# Patient Record
Sex: FEMALE | Race: WHITE | NOT HISPANIC OR LATINO | Employment: OTHER | URBAN - METROPOLITAN AREA
[De-identification: names, ages, dates, MRNs, and addresses within clinical notes are randomized per-mention and may not be internally consistent; named-entity substitution may affect disease eponyms.]

---

## 2017-01-05 ENCOUNTER — CLINICAL SUPPORT (OUTPATIENT)
Dept: DIABETES | Facility: CLINIC | Age: 77
End: 2017-01-05
Payer: MEDICARE

## 2017-01-05 DIAGNOSIS — N18.2 TYPE 2 DIABETES MELLITUS WITH STAGE 2 CHRONIC KIDNEY DISEASE, WITHOUT LONG-TERM CURRENT USE OF INSULIN: Primary | ICD-10-CM

## 2017-01-05 DIAGNOSIS — N18.2 TYPE 2 DIABETES MELLITUS WITH STAGE 2 CHRONIC KIDNEY DISEASE, WITHOUT LONG-TERM CURRENT USE OF INSULIN: ICD-10-CM

## 2017-01-05 DIAGNOSIS — E11.22 TYPE 2 DIABETES MELLITUS WITH STAGE 2 CHRONIC KIDNEY DISEASE, WITHOUT LONG-TERM CURRENT USE OF INSULIN: Primary | ICD-10-CM

## 2017-01-05 DIAGNOSIS — E11.22 TYPE 2 DIABETES MELLITUS WITH STAGE 2 CHRONIC KIDNEY DISEASE, WITHOUT LONG-TERM CURRENT USE OF INSULIN: ICD-10-CM

## 2017-01-05 PROCEDURE — G0108 DIAB MANAGE TRN  PER INDIV: HCPCS | Mod: S$GLB,,, | Performed by: DIETITIAN, REGISTERED

## 2017-01-05 NOTE — PROGRESS NOTES
01/05/17 0000   Diabetes Type   Diabetes Type  Type II   Diabetes History   Diabetes Diagnosis >10 years   Nutrition   Meal Planning skipping meals;eats out seldom;snacks between meal;artificial sweeteners  (Pt reports loss of taste buds and states that she tastes little to nothing. Reports not liking many starchy foods and reports that most carbs come from sweets. Drinks very little water)   Meal Plan 24 Hour Recall - Breakfast drinks 1 pot of coffee in the AM and then 1 hr later will have PRO and veg   Meal Plan 24 Hour Recall - Lunch around 2 PM has PRO and veg   Meal Plan 24 Hour Recall - Dinner states that she eats very little dinner because of her hernia   Meal Plan 24 Hour Recall - Snack states that she eats 1/2 cup ice cream in the middle of the day   Monitoring    Blood Glucose Logs No  (no logs present at visit, reports not SMBG since first being dx)   Exercise    Frequency Never   Current Diabetes Treatment    Current Treatment Oral Medicaiton  (taking Metformin 1000 mg 2 times per day. denies missed doses of meds. Reports that in the last 2 weeks she had family over and had misplaced some of her meds - she seems to think that it was her BP and CHOL meds)   Social History   Preferred Learning Method Face to Face;Demonstration;Hands On;Reading Materials   Primary Support Self   Barriers to Change   Barriers to Change Cognitive  (Pt reports memory loss, and chart identifies memory issues as well - Pt is a poor historian re meds)   Learning Challenges  Other  (memory loss)   Readiness to Learn    Readiness to Learn  Acceptance   Diabetes Education Visit   Diabetes Education Record Assessment/Progress Initial/Comprehensive   Diabetes Education Assessment/Progress   Acute Complications (preventing, detecting, and treating acute complications) DC;IS;1  (explained that risk of hypo is very low with current meds)   Chronic Complications (preventing, detecting, and treating chronic complications)  DC;IS;1  (reviewed standards of care)   Nutrition (Incorporating nutritional management into one's lifestyle) DC;IS;W;5;1  (instructed on what foods have carbs, timing and spacing of meals and snacks, importance of meal balance, provided DM meal planning guide and encouraged 3 regular balanced meals with small portions to help with hernia. Also encouraged plenty of water rather than coffee and diet drinks)   Physical Activity (incorporating physical activity into one's lifestyle) DC;IS;5;1  (encouraged 150 min per wk)   Medications (states correct name, dose, onset, peak, duration, side effects & timing of meds) DC;IS;1  (discussed timing and MOA of metformin)   Monitoring (monitoring blood glucose/other parameters &using results) D;DC;IS;5;1  (Provided Accu-Chek Avivia Connect meter. Pt refused to return demo stating that she understood how to use the meter. Instructed to SMBG daily at varying times of the day and to bring logs to all PCP visits)   Goal Setting and Problem Solving (verbalizes behavior change strategies & sets realistic goals) DC   Behavior Change (developing personal strategies to health & behavior change) DC   Psychosocial Issues (deveopling personal srategies to address psychosocial concerns) DC   Goals   Physical Activity Set  (Pt will gradually increaase PA to 150 min per wk)   Start Date 01/05/17   Diabetes Care Plan/Intervention   Education Plan/Intervention In F/U DSMT   Diabetes Self-Management Support Plan F/U Prov;F/U DE   Education Units of Time    Time Spent 60 min

## 2017-01-05 NOTE — TELEPHONE ENCOUNTER
Patient called to discuss bringing her medications to pharmacy for med rec and pill box filling. A message was left on her phone with the [facility] direct line ([phone]).    Could you reach out to her to set up a time for med rec and pill box filling?    Thanks,  THOR

## 2017-01-11 ENCOUNTER — OFFICE VISIT (OUTPATIENT)
Dept: OPHTHALMOLOGY | Facility: CLINIC | Age: 77
End: 2017-01-11
Payer: MEDICARE

## 2017-01-11 DIAGNOSIS — H04.123 BILATERAL DRY EYES: ICD-10-CM

## 2017-01-11 DIAGNOSIS — H40.043 STEROID RESPONDERS, BILATERAL: Primary | ICD-10-CM

## 2017-01-11 DIAGNOSIS — H26.493 POSTERIOR CAPSULAR OPACIFICATION, BILATERAL: ICD-10-CM

## 2017-01-11 DIAGNOSIS — T86.8409 REJECTION OF CORNEAL GRAFT: ICD-10-CM

## 2017-01-11 PROCEDURE — 99999 PR PBB SHADOW E&M-EST. PATIENT-LVL II: CPT | Mod: PBBFAC,,, | Performed by: OPHTHALMOLOGY

## 2017-01-11 PROCEDURE — 92014 COMPRE OPH EXAM EST PT 1/>: CPT | Mod: S$GLB,,, | Performed by: OPHTHALMOLOGY

## 2017-01-11 NOTE — MR AVS SNAPSHOT
[facility] - Ophthalmology  [address]  [place] [address]  Phone: [phone]  Fax: [phone]                  Kayla Sanchez   2017 2:45 PM   Office Visit    Description:  Female : 1940   Provider:  Joe Crane MD   Department:  [facility] - [facility]           Reason for Visit     Blurred Vision left eye           Diagnoses this Visit        Comments    Steroid responders, bilateral    -  Primary     Rejection of corneal graft         Bilateral dry eyes         Posterior capsular opacification, bilateral                To Do List           Future Appointments        Provider Department Dept Phone    2017 12:00 PM Annette Ruiz MD [facility] - Internal Medicine [phone]      Goals (5 Years of Data)     None      [facility]     [facility] Nurse Care Line -  Assistance  Registered nurses in the [facility] provide clinical advisement, health education, appointment booking, and other advisory services.  Call for this free service at [phone].             Medications           Message regarding Medications     Verify the changes and/or additions to your medication regime listed below are the same as discussed with your clinician today.  If any of these changes or additions are incorrect, please notify your healthcare provider.             Verify that the below list of medications is an accurate representation of the medications you are currently taking.  If none reported, the list may be blank. If incorrect, please contact your healthcare provider. Carry this list with you in case of emergency.           Current Medications     ACCU-CHEK FABIO Strp 1 strip by Misc.(Non-Drug; Combo Route) route once daily.    ACCU-CHEK FASTCLIX Misc 1 each by Misc.(Non-Drug; Combo Route) route once daily.    ascorbic acid (VITAMIN C) 500 MG tablet Take 500 mg by mouth once daily.    aspirin (ECOTRIN) 81 MG EC tablet Take 81 mg by mouth once daily.     atenolol-chlorthalidone (TENORETIC) 50-25 mg Tab Take 1 tablet by mouth every evening.    calcium carbonate 400 mg (1,000 mg) Chew Take by mouth. 1 Tablet, Chewable Oral .  Take 3 tablets PO X 1 week then2 tablets PO for 1 week then1 tablet PO for 1 week    cyclosporine 2% opht drop Place 1 drop into both eyes 2 (two) times daily.    dorzolamide-timolol 2-0.5% (COSOPT) 22.3-6.8 mg/mL ophthalmic solution INSTILL 1 DROP INTO BOTH EYES TWICE DAILY    folic acid (FOLVITE) 1 MG tablet Take 1 mg by mouth once daily.    GLUCOSAMINE/D3/BOSWELLIA FAISAL (OSTEO BI-FLEX, 5-LOXIN, ORAL) Take 1 tablet by mouth once daily.    levothyroxine (SYNTHROID) 100 MCG tablet TAKE ONE TABLET BY MOUTH ONE TIME DAILY BEFORE BREAKFAST    lisinopril (PRINIVIL,ZESTRIL) 2.5 MG tablet TAKE 1 TABLET BY MOUTH DAILY    magnesium oxide (MAG-OX) 250 mg Tab Take 250 mg by mouth. 1 Tablet Oral Every day    metformin (GLUCOPHAGE) 1000 MG tablet Take 1 tablet (1,000 mg total) by mouth 2 (two) times daily with meals.    mometasone (NASONEX) 50 mcg/actuation nasal spray 2 sprays by Nasal route once daily. 1 Spray, Non-Aerosol Nasal Every evening    multivitamin (THERAGRAN) per tablet Take by mouth. 1 Tablet Oral Every evening    naproxen (NAPROSYN) 250 MG tablet Take 250 mg by mouth as needed.    NON FORMULARY MEDICATION Cyclosporin eye gtts 2% one gtt OU BID    omeprazole 20 mg TbEC Take 20 mg by mouth daily 2 hours after breakfast.    polyethylene glycol (GLYCOLAX) 17 gram PwPk Take 17 g by mouth daily as needed.    potassium chloride SA (K-DUR,KLOR-CON) 20 MEQ tablet Take 1 tablet (20 mEq total) by mouth once daily. 1 Tablet, ER Particles/Crystals Oral Every day    selenium sulfide (SELSUN) 2.5 % Susp 2.5 %.  Shampoo Topical Every evening    simvastatin (ZOCOR) 40 MG tablet TAKE 1 TABLET BY MOUTH AT BEDTIME    tobramycin sulfate 0.3% (TOBREX) 0.3 % ophthalmic solution 1-2 drops topically twice daily to affected area right great toe.    vitamin D 1000  units Tab Take 185 mg by mouth 2 (two) times daily.           Clinical Reference Information           Allergies as of 1/11/2017     No Known Allergies      Immunizations Administered on Date of Encounter - 1/11/2017     None

## 2017-01-11 NOTE — PROGRESS NOTES
HPI     DLS 5/04/16     Pt states she cannot tell any change in her VA since last visit. The OS is   still blurred.  Did not get new glasses-waiting for the OD to stabilize   and unsure when that will be.  C/o OU still burn, if instills drops and   they do not stop burning after 4 hrs, she just takes a nap.      Eye meds: Cyclosporin BID                      Cosopt BID OU         Last edited by Deanna Batres on 1/11/2017  2:57 PM.         Assessment /Plan     For exam results, see Encounter Report.    Steroid responders, bilateral    Rejection of corneal graft    Bilateral dry eyes - Both Eyes    Posterior capsular opacification, bilateral      OU DSEK graft attached and clear. Signs and symptoms of graft rejection reviewed.  Visually significant posterior capsular opacity present.  Discussed risks, benefits, and alternatives to laser surgery.  Plan YAG OS.   C/o stinging of CsA, but hx of IOP and rejection OS.

## 2017-01-17 ENCOUNTER — TELEPHONE (OUTPATIENT)
Dept: OPHTHALMOLOGY | Facility: CLINIC | Age: 77
End: 2017-01-17

## 2017-01-17 NOTE — TELEPHONE ENCOUNTER
----- Message from Tha Valdes sent at 1/17/2017 12:30 PM CST -----  Contact: Kayla Sanchez [[id number]]  Pt states she had an exam yesterday and was told by doctor to schedule a laser appt with Dr Rangel,please call back at [phone],thanks

## 2017-02-20 DIAGNOSIS — I10 ESSENTIAL HYPERTENSION: ICD-10-CM

## 2017-02-20 DIAGNOSIS — E03.9 HYPOTHYROIDISM, UNSPECIFIED TYPE: ICD-10-CM

## 2017-02-20 DIAGNOSIS — E11.9 TYPE 2 DIABETES MELLITUS WITHOUT COMPLICATION: ICD-10-CM

## 2017-02-20 DIAGNOSIS — E11.9 TYPE 2 DIABETES MELLITUS WITHOUT COMPLICATION, WITHOUT LONG-TERM CURRENT USE OF INSULIN: ICD-10-CM

## 2017-02-20 DIAGNOSIS — E78.2 MIXED HYPERLIPIDEMIA: Primary | ICD-10-CM

## 2017-02-20 NOTE — TELEPHONE ENCOUNTER
Pt would like the following medications re filled and sent in to [facility] pharmacy .      Thanks Dr. Ruiz

## 2017-02-20 NOTE — TELEPHONE ENCOUNTER
----- Message from Clair Morgan sent at 2/20/2017  3:41 PM CST -----  Contact: pharmacy/kaitlin/[phone]  Pharmacy called in regards to getting a Rx for metformin (GLUCOPHAGE) 1000 MG tablet/levothyroxine (SYNTHROID) 100 MCG tablet/ simvastatin (ZOCOR) 40 MG tablet/atenolol-chlorthalidone (TENORETIC) 50-25 mg Tab      antonio  Please advise

## 2017-02-21 RX ORDER — SIMVASTATIN 40 MG/1
40 TABLET, FILM COATED ORAL NIGHTLY
Qty: 90 TABLET | Refills: 3 | Status: SHIPPED | OUTPATIENT
Start: 2017-02-21 | End: 2018-03-09 | Stop reason: SDUPTHER

## 2017-02-21 RX ORDER — ATENOLOL AND CHLORTHALIDONE TABLET 50; 25 MG/1; MG/1
1 TABLET ORAL NIGHTLY
Qty: 90 TABLET | Refills: 3 | Status: SHIPPED | OUTPATIENT
Start: 2017-02-21 | End: 2018-03-09 | Stop reason: SDUPTHER

## 2017-02-21 RX ORDER — SIMVASTATIN 40 MG/1
40 TABLET, FILM COATED ORAL NIGHTLY
Qty: 90 TABLET | Refills: 3 | Status: SHIPPED | OUTPATIENT
Start: 2017-02-21 | End: 2017-02-21 | Stop reason: SDUPTHER

## 2017-02-21 RX ORDER — ATENOLOL AND CHLORTHALIDONE TABLET 50; 25 MG/1; MG/1
1 TABLET ORAL NIGHTLY
Qty: 90 TABLET | Refills: 3 | Status: SHIPPED | OUTPATIENT
Start: 2017-02-21 | End: 2017-02-21 | Stop reason: SDUPTHER

## 2017-02-21 RX ORDER — LEVOTHYROXINE SODIUM 100 UG/1
TABLET ORAL
Qty: 90 TABLET | Refills: 3 | Status: SHIPPED | OUTPATIENT
Start: 2017-02-21 | End: 2017-02-21 | Stop reason: SDUPTHER

## 2017-02-21 RX ORDER — LEVOTHYROXINE SODIUM 100 UG/1
TABLET ORAL
Qty: 90 TABLET | Refills: 3 | Status: SHIPPED | OUTPATIENT
Start: 2017-02-21 | End: 2018-03-09 | Stop reason: SDUPTHER

## 2017-02-21 RX ORDER — METFORMIN HYDROCHLORIDE 1000 MG/1
1000 TABLET ORAL 2 TIMES DAILY WITH MEALS
Qty: 180 TABLET | Refills: 3 | Status: SHIPPED | OUTPATIENT
Start: 2017-02-21 | End: 2017-02-21 | Stop reason: SDUPTHER

## 2017-02-21 RX ORDER — METFORMIN HYDROCHLORIDE 1000 MG/1
1000 TABLET ORAL 2 TIMES DAILY WITH MEALS
Qty: 180 TABLET | Refills: 3 | Status: SHIPPED | OUTPATIENT
Start: 2017-02-21 | End: 2018-03-09 | Stop reason: SDUPTHER

## 2017-02-21 NOTE — TELEPHONE ENCOUNTER
Patient had [facility] listed as her pharmacy, and I sent the scripts there, then realized the error and re-sent to [facility]. Can you confirm that she only wants scripts from [facility]? If please call the [facility] below and cancel those scripts sent today.     Here is the location:  [facility] [address] [facility] [address] AT [facility] [address]    Thanks!  THOR

## 2017-03-02 NOTE — TELEPHONE ENCOUNTER
----- Message from Nuha Palumbo sent at 3/1/2017  4:21 PM CST -----  Contact: Pt  Pt would a like a refill of one of her prescriptions, she can't remember which one, she says she lost it during mardi gras. She can be reached at [phone]

## 2017-03-14 ENCOUNTER — OFFICE VISIT (OUTPATIENT)
Dept: OPHTHALMOLOGY | Facility: CLINIC | Age: 77
End: 2017-03-14
Payer: MEDICARE

## 2017-03-14 DIAGNOSIS — H40.60X0 STEROID-INDUCED GLAUCOMA: ICD-10-CM

## 2017-03-14 DIAGNOSIS — H04.123 BILATERAL DRY EYES: ICD-10-CM

## 2017-03-14 DIAGNOSIS — H26.492 POSTERIOR CAPSULAR OPACIFICATION, LEFT: Primary | ICD-10-CM

## 2017-03-14 DIAGNOSIS — Z96.1 STATUS POST CATARACT EXTRACTION AND INSERTION OF INTRAOCULAR LENS, UNSPECIFIED LATERALITY: ICD-10-CM

## 2017-03-14 DIAGNOSIS — T38.0X5A STEROID-INDUCED GLAUCOMA: ICD-10-CM

## 2017-03-14 DIAGNOSIS — Z98.49 STATUS POST CATARACT EXTRACTION AND INSERTION OF INTRAOCULAR LENS, UNSPECIFIED LATERALITY: ICD-10-CM

## 2017-03-14 DIAGNOSIS — H40.30X0: ICD-10-CM

## 2017-03-14 PROCEDURE — 99999 PR PBB SHADOW E&M-EST. PATIENT-LVL I: CPT | Mod: PBBFAC,,, | Performed by: OPHTHALMOLOGY

## 2017-03-14 PROCEDURE — 92014 COMPRE OPH EXAM EST PT 1/>: CPT | Mod: 57,S$GLB,, | Performed by: OPHTHALMOLOGY

## 2017-03-14 PROCEDURE — 66821 AFTER CATARACT LASER SURGERY: CPT | Mod: LT,S$GLB,, | Performed by: OPHTHALMOLOGY

## 2017-03-14 NOTE — MR AVS SNAPSHOT
[facility] - Ophthalmology  [address]  [facility] [address]  Phone: [phone]  Fax: [phone]                  Kayla Sanchez   3/14/2017 1:30 PM   Office Visit    Description:  Female : 1940   Provider:  Leandro Rangel MD   Department:  [facility] - Ophthalmology           Reason for Visit     Glaucoma     Blurred Vision           Diagnoses this Visit        Comments    Posterior capsular opacification, left    -  Primary     Steroid-induced glaucoma         Traumatic glaucoma         Status post cataract extraction and insertion of intraocular lens, unspecified laterality         Bilateral dry eyes                To Do List           Future Appointments        Provider Department Dept Phone    3/27/2017 9:00 AM Leandro Rangel MD [facility] Ophthalmology [phone]      Goals (5 Years of Data)     None      Follow-Up and Disposition     Return in about 2 weeks (around 3/28/2017), or if symptoms worsen or fail to improve, for Pressure and Dilate.      [facility]     [facility] On Call Nurse Care Line -  Assistance  Registered nurses in the [facility] Call Center provide clinical advisement, health education, appointment booking, and other advisory services.  Call for this free service at [phone].             Medications           Message regarding Medications     Verify the changes and/or additions to your medication regime listed below are the same as discussed with your clinician today.  If any of these changes or additions are incorrect, please notify your healthcare provider.             Verify that the below list of medications is an accurate representation of the medications you are currently taking.  If none reported, the list may be blank. If incorrect, please contact your healthcare provider. Carry this list with you in case of emergency.           Current Medications     ACCU-CHEK FABIO Strp 1 strip by Misc.(Non-Drug; Combo Route) route once daily.     ACCU-CHEK FASTCLIX Misc 1 each by Misc.(Non-Drug; Combo Route) route once daily.    ascorbic acid (VITAMIN C) 500 MG tablet Take 500 mg by mouth once daily.    aspirin (ECOTRIN) 81 MG EC tablet Take 81 mg by mouth once daily.    atenolol-chlorthalidone (TENORETIC) 50-25 mg Tab Take 1 tablet by mouth every evening.    calcium carbonate 400 mg (1,000 mg) Chew Take by mouth. 1 Tablet, Chewable Oral .  Take 3 tablets PO X 1 week then2 tablets PO for 1 week then1 tablet PO for 1 week    cyclosporine 2% opht drop Place 1 drop into both eyes 2 (two) times daily.    dorzolamide-timolol 2-0.5% (COSOPT) 22.3-6.8 mg/mL ophthalmic solution INSTILL 1 DROP INTO BOTH EYES TWICE DAILY    folic acid (FOLVITE) 1 MG tablet Take 1 mg by mouth once daily.    GLUCOSAMINE/D3/BOSWELLIA FAISAL (OSTEO BI-FLEX, 5-LOXIN, ORAL) Take 1 tablet by mouth once daily.    levothyroxine (SYNTHROID) 100 MCG tablet TAKE ONE TABLET BY MOUTH ONE TIME DAILY BEFORE BREAKFAST    lisinopril (PRINIVIL,ZESTRIL) 2.5 MG tablet TAKE 1 TABLET BY MOUTH DAILY    magnesium oxide (MAG-OX) 250 mg Tab Take 250 mg by mouth. 1 Tablet Oral Every day    metformin (GLUCOPHAGE) 1000 MG tablet Take 1 tablet (1,000 mg total) by mouth 2 (two) times daily with meals.    mometasone (NASONEX) 50 mcg/actuation nasal spray 2 sprays by Nasal route once daily. 1 Spray, Non-Aerosol Nasal Every evening    multivitamin (THERAGRAN) per tablet Take by mouth. 1 Tablet Oral Every evening    naproxen (NAPROSYN) 250 MG tablet Take 250 mg by mouth as needed.    NON FORMULARY MEDICATION Cyclosporin eye gtts 2% one gtt OU BID    omeprazole 20 mg TbEC Take 20 mg by mouth daily 2 hours after breakfast.    polyethylene glycol (GLYCOLAX) 17 gram PwPk Take 17 g by mouth daily as needed.    potassium chloride SA (K-DUR,KLOR-CON) 20 MEQ tablet Take 1 tablet (20 mEq total) by mouth once daily. 1 Tablet, ER Particles/Crystals Oral Every day    selenium sulfide (SELSUN) 2.5 % Susp 2.5 %.  Shampoo Topical Every  evening    simvastatin (ZOCOR) 40 MG tablet Take 1 tablet (40 mg total) by mouth every evening.    tobramycin sulfate 0.3% (TOBREX) 0.3 % ophthalmic solution 1-2 drops topically twice daily to affected area right great toe.    vitamin D 1000 units Tab Take 185 mg by mouth 2 (two) times daily.           Clinical Reference Information           Allergies as of 3/14/2017     No Known Allergies      Immunizations Administered on Date of Encounter - 3/14/2017     None      Orders Placed During Today's Visit      Normal Orders This Visit    Yag Capsulotomy - OS - Left Eye       Language Assistance Services     ATTENTION: Language assistance services are available, free of charge. Please call [phone].      ATENCIÓN: Si elenila aide, tiene a beltre disposición servicios gratuitos de asistencia lingüística. Llame al [phone].     DEMETRI Ý: N?u b?n nói Ti?ng Vi?t, có các d?ch v? h? tr? ngôn ng? mi?n phí dành cho b?n. G?i s? [phone].         [facility] complies with applicable Federal civil rights laws and does not discriminate on the basis of race, color, national origin, age, disability, or sex.

## 2017-03-14 NOTE — PROGRESS NOTES
HPI     Glaucoma    Additional comments: Poss Yag Cap OS- Per Rosa Maria           Comments   Blurred vision OS  Reports taking Cosopt BID OU and CS BID OS  Told to have laser for membrane removed left eye       Last edited by Leandro Rangel MD on 3/14/2017  6:22 PM. (History)            Assessment /Plan     For exam results, see Encounter Report.    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 Signs and symptoms of graft rejection reviewed.   -Hx rejection OS in the past off steroids      Dry Eye Syndrome: discussed use of warm compresses, preserved & non-preserved artificial tears, gel and PM ointment options.            Laser Capsulotomy  left eye    Laser Capsulotomy Surgery Consent:  Patient with visually significant capsular opacification negatively impacting visually based ADLs and QOL.  Discussed with patient options, risks and benefits, expectations of laser surgery with questions and answers to facilitate discussion.  We specifically covered the risks of IOP spike, bleeding, lens disruption, persistent visual disturbance,macular edema, retinal detachment,  iritis & pain,  and the need for further surgery.  The patient  voiced good understanding and patient wishes to proceed with surgery.  The patient will likely  benefit from laser surgery and patient signed consent for Left Eye.      The correct eye was identified by patient prior to start of the procedure.    YAG Capsulotomy:    Total Energy:  97.6 mJ    Total # of Exposures: 67 Burst    Patient tolerated procedure well       Kayla understands the information Dr. Rangel provided at the time of visit.  The patient voices good understanding of these these instructions and agrees with the plan.  Retinal detachment precautions were discussed and patient is to return for increasing flashes, floaters and decreasing vision.  In addition, patient will return to clinic sooner as needed for pain, decreasing vision etc.    PF 1% 4x/day for 4-5 Days in the eye with laser treatment      RD precautions:  Discussed with patient symptoms of RD with increased flashes, floaters, decreasing vision.  Patient/Family to call and return immediately to clinic should the symptoms of RD occur.  patient voice good understanding.        Plan   RTC  1-2 weeks with IOP & DFE  / same day with Joe Crane   --> check adherence & DSEK rejection OS  RTC sooner prn with good understanding

## 2017-03-15 ENCOUNTER — TELEPHONE (OUTPATIENT)
Dept: OPHTHALMOLOGY | Facility: CLINIC | Age: 77
End: 2017-03-15

## 2017-03-15 NOTE — TELEPHONE ENCOUNTER
----- Message from Anju Westfall sent at 3/14/2017  5:04 PM CDT -----  Ulysses,    I called [facility].  They actually have two refills on file.     -Anju   ----- Message -----     From: Candy Beard     Sent: 3/14/2017   4:42 PM       To: Joe Crane MD, [facility] Staff    Help!  Dr. Rangel would marino for Dr. Crane to refill pt's cyclosporin gtts.   Pt is almost out of them.   Thank you!!!!    Spoke to pt and confirmed that she needs to call [facility] for her refill

## 2017-03-15 NOTE — TELEPHONE ENCOUNTER
----- Message from Anju Westfall sent at 3/14/2017  5:04 PM CDT -----  Ulysses,    I called [facility].  They actually have two refills on file.     -Anju   ----- Message -----     From: Candy Beard     Sent: 3/14/2017   4:42 PM       To: Joe Crane MD, [facility] Staff    Help!  Dr. Rangel would marino for Dr. Crane to refill pt's cyclosporin gtts.   Pt is almost out of them.   Thank you!!!!

## 2017-03-27 ENCOUNTER — OFFICE VISIT (OUTPATIENT)
Dept: OPHTHALMOLOGY | Facility: CLINIC | Age: 77
End: 2017-03-27
Payer: MEDICARE

## 2017-03-27 DIAGNOSIS — Z96.1 STATUS POST CATARACT EXTRACTION AND INSERTION OF INTRAOCULAR LENS, UNSPECIFIED LATERALITY: Primary | ICD-10-CM

## 2017-03-27 DIAGNOSIS — H40.30X0: ICD-10-CM

## 2017-03-27 DIAGNOSIS — Z98.49 STATUS POST CATARACT EXTRACTION AND INSERTION OF INTRAOCULAR LENS, UNSPECIFIED LATERALITY: Primary | ICD-10-CM

## 2017-03-27 DIAGNOSIS — T38.0X5A STEROID-INDUCED GLAUCOMA: ICD-10-CM

## 2017-03-27 DIAGNOSIS — T85.398S: ICD-10-CM

## 2017-03-27 DIAGNOSIS — H40.60X0 STEROID-INDUCED GLAUCOMA: ICD-10-CM

## 2017-03-27 DIAGNOSIS — H04.123 BILATERAL DRY EYES: ICD-10-CM

## 2017-03-27 PROBLEM — H26.492 LEFT POSTERIOR CAPSULAR OPACIFICATION: Status: RESOLVED | Noted: 2017-03-14 | Resolved: 2017-03-27

## 2017-03-27 PROCEDURE — 99999 PR PBB SHADOW E&M-EST. PATIENT-LVL I: CPT | Mod: PBBFAC,,, | Performed by: OPHTHALMOLOGY

## 2017-03-27 PROCEDURE — 99024 POSTOP FOLLOW-UP VISIT: CPT | Mod: S$GLB,,, | Performed by: OPHTHALMOLOGY

## 2017-03-27 NOTE — PROGRESS NOTES
HPI     Post-op Evaluation    Additional comments: Pt here for post op Yag Cap evaluation of OS           Comments   DLS: 03/14/2017  Dr. Rangel    C/o: Pt states she thinks the vision in the left eye is doing about the   same she doesn't notice anything different. 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     Meds: Cosopt BID OU             Cyclosporin gtt's.  BID OU ( Pt states she mostly puts it in   her OS)        Last edited by Miranda Patterson MA on 3/27/2017  9:21 AM. (History)            Assessment /Plan     For exam results, see Encounter Report.    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 Signs and symptoms of graft rejection reviewed.   -Hx rejection OS in the past off steroids      Dry Eye Syndrome: discussed use of warm compresses, preserved & non-preserved artificial tears, gel and PM ointment options.        Plan   RTC  2 months Joe Crane   --> check adherence & DSEK rejection OS  RTC sooner prn with good understanding

## 2017-03-27 NOTE — Clinical Note
MICHAEL Crane Patient unable to take steroids with elevated IOP Now taking CS 2% but unable to afford ? Letter to insurance to cover cost as no other alternative

## 2017-05-01 DIAGNOSIS — E11.9 TYPE 2 DIABETES MELLITUS WITHOUT COMPLICATION: ICD-10-CM

## 2017-05-17 ENCOUNTER — TELEPHONE (OUTPATIENT)
Dept: OPHTHALMOLOGY | Facility: CLINIC | Age: 77
End: 2017-05-17

## 2017-05-31 ENCOUNTER — OFFICE VISIT (OUTPATIENT)
Dept: OPHTHALMOLOGY | Facility: CLINIC | Age: 77
End: 2017-05-31
Payer: MEDICARE

## 2017-05-31 DIAGNOSIS — H40.60X0 STEROID-INDUCED GLAUCOMA: Primary | ICD-10-CM

## 2017-05-31 DIAGNOSIS — H18.519 FUCHS' CORNEAL DYSTROPHY: ICD-10-CM

## 2017-05-31 DIAGNOSIS — T38.0X5A STEROID-INDUCED GLAUCOMA: Primary | ICD-10-CM

## 2017-05-31 PROCEDURE — 92014 COMPRE OPH EXAM EST PT 1/>: CPT | Mod: 24,S$GLB,, | Performed by: OPHTHALMOLOGY

## 2017-05-31 PROCEDURE — 99999 PR PBB SHADOW E&M-EST. PATIENT-LVL II: CPT | Mod: PBBFAC,,, | Performed by: OPHTHALMOLOGY

## 2017-05-31 NOTE — PROGRESS NOTES
HPI     DSEK    Additional comments: Both eyes           Comments   DLS: 03/27/2017 Dr. Rangel  DLS: 01/11/2017 Dr. Crane    Patient states she is here to check on her cornea.     Meds: Cosopt BID OU              Cyclosporin gtt's.  BID OU ( Pt states she mostly puts it in   her OS)       Last edited by Zuri Saxena on 5/31/2017 10:49 AM. (History)            Assessment /Plan     For exam results, see Encounter Report.    Steroid-induced glaucoma - Left Eye    Fuchs' corneal dystrophy      IOP good, OU DSEK grafts attached and clear. Signs and symptoms of graft rejection reviewed.  Looks good.  Continue current treatment/medications

## 2017-07-11 ENCOUNTER — TELEPHONE (OUTPATIENT)
Dept: OPHTHALMOLOGY | Facility: CLINIC | Age: 77
End: 2017-07-11

## 2017-07-11 NOTE — TELEPHONE ENCOUNTER
Faxed ok for [facility], [phone] refills w/90 day supply given

## 2017-08-07 ENCOUNTER — TELEPHONE (OUTPATIENT)
Dept: INTERNAL MEDICINE | Facility: CLINIC | Age: 77
End: 2017-08-07

## 2017-08-07 NOTE — TELEPHONE ENCOUNTER
Spoke with Patient and she informed me that her daughter cancelled her appt for today due to the time not being good and that she wanted a later appt. , Patient has been advised about later date which is further out and she has been given the times to go over with her daughter and will call back to inform us if those times work for her and her daughters  .

## 2017-08-07 NOTE — TELEPHONE ENCOUNTER
----- Message from Jovana Arboleda sent at 8/7/2017  3:15 PM CDT -----  Contact: pt daughter, Ceci Brizuela  Pt daughter called in about not able to make appt in morning. She would need the latest appt  has available.      Ceci can be reached at  [phone]      Thank You

## 2017-08-07 NOTE — TELEPHONE ENCOUNTER
lvm for pt to call the clinic back pt needs to follow up with her pcp  There is an appt in the system for 08- at 8am

## 2017-08-08 ENCOUNTER — TELEPHONE (OUTPATIENT)
Dept: INTERNAL MEDICINE | Facility: CLINIC | Age: 77
End: 2017-08-08

## 2017-08-08 NOTE — TELEPHONE ENCOUNTER
Spoke with patients daughter and she informed me that they prefer later appts.    Due to patients daughters wanting to be present at appointments and that they can not afford to miss work or take off from work .     Patient daughter booked an appointment with Dr. Jacob and informed me that Dr. Ruiz was sent an e-mail asking if patient could be seen on 08/10 at 5 pm .      Please advise

## 2017-08-08 NOTE — TELEPHONE ENCOUNTER
----- Message from April Flores sent at 8/8/2017  9:16 AM CDT -----  Contact: DAUGHTER/MAR/ [phone]  Please call daughter Ceci about her mothers appointment with another provider. Dr. Ruiz  did not have anything sooner and patient need a late appointment so that both her daughters will be at her appointment.

## 2017-08-09 ENCOUNTER — TELEPHONE (OUTPATIENT)
Dept: INTERNAL MEDICINE | Facility: CLINIC | Age: 77
End: 2017-08-09

## 2017-08-09 NOTE — TELEPHONE ENCOUNTER
Spoke with patient's daughter-she would like the 8/19 appointment for 10am.  I don't have access to schedule-will route to PCP's staff so they can assist.  Call daughter to confirm appt at [phone]

## 2017-08-09 NOTE — TELEPHONE ENCOUNTER
Please let patient's daughter know that I do not work in the evenings. Would she be willing to come in on a Saturday instead, or does she also work on Saturdays?    If she is seeing Dr Jacob about having her mother assessed for dementia and memory loss, then she should consider seeing me instead. Dr Jacob is urgent care, and addresses only acute issues that can be fixed or evaluated at one appointment. Ms Sanchez's conditions are chronic and need consistent involvement from one primary care physician, which would be what we do at her appointments with me.    I could potentially be available on Sat 8/19 at the earliest.    Thanks,  THOR

## 2017-08-10 NOTE — TELEPHONE ENCOUNTER
We will get patient an appt once my schedule opens up. We are talking to administration on opening my appointment calendar for that day. We will reach out once it's all set.    Thanks,  THOR

## 2017-08-15 NOTE — TELEPHONE ENCOUNTER
Patient has been informed that we will reach out to her once approved . Patient stated that she is going out of town so re schedule after 2 weeks.

## 2017-08-16 ENCOUNTER — TELEPHONE (OUTPATIENT)
Dept: INTERNAL MEDICINE | Facility: CLINIC | Age: 77
End: 2017-08-16

## 2017-08-17 NOTE — TELEPHONE ENCOUNTER
----- Message from Amairani Lopez sent at 8/17/2017  1:22 PM CDT -----  Contact: Ofelia/Ceci [phone] work or [phone] cell  Calling to schedule an appt for Saturday.    Please call and schedule.    Thank You

## 2017-08-31 ENCOUNTER — TELEPHONE (OUTPATIENT)
Dept: INTERNAL MEDICINE | Facility: CLINIC | Age: 77
End: 2017-08-31

## 2017-09-12 ENCOUNTER — TELEPHONE (OUTPATIENT)
Dept: INTERNAL MEDICINE | Facility: CLINIC | Age: 77
End: 2017-09-12

## 2017-09-12 NOTE — TELEPHONE ENCOUNTER
----- Message from Treasure Sullivan MA sent at 8/16/2017  2:49 PM CDT -----  Please Advise when schedule is opened

## 2017-09-16 ENCOUNTER — IMMUNIZATION (OUTPATIENT)
Dept: INTERNAL MEDICINE | Facility: CLINIC | Age: 77
End: 2017-09-16
Payer: MEDICARE

## 2017-09-16 ENCOUNTER — LAB VISIT (OUTPATIENT)
Dept: LAB | Facility: HOSPITAL | Age: 77
End: 2017-09-16
Attending: INTERNAL MEDICINE
Payer: MEDICARE

## 2017-09-16 ENCOUNTER — OFFICE VISIT (OUTPATIENT)
Dept: INTERNAL MEDICINE | Facility: CLINIC | Age: 77
End: 2017-09-16
Payer: MEDICARE

## 2017-09-16 VITALS
BODY MASS INDEX: 27.34 KG/M2 | SYSTOLIC BLOOD PRESSURE: 127 MMHG | WEIGHT: 154.31 LBS | DIASTOLIC BLOOD PRESSURE: 71 MMHG | TEMPERATURE: 98 F | HEIGHT: 63 IN | HEART RATE: 59 BPM

## 2017-09-16 DIAGNOSIS — I15.2 HYPERTENSION ASSOCIATED WITH DIABETES: ICD-10-CM

## 2017-09-16 DIAGNOSIS — E78.5 HYPERLIPIDEMIA DUE TO TYPE 2 DIABETES MELLITUS: ICD-10-CM

## 2017-09-16 DIAGNOSIS — I70.0 AORTIC ATHEROSCLEROSIS: ICD-10-CM

## 2017-09-16 DIAGNOSIS — E11.59 HYPERTENSION ASSOCIATED WITH DIABETES: ICD-10-CM

## 2017-09-16 DIAGNOSIS — R41.3 MEMORY DISORDER: ICD-10-CM

## 2017-09-16 DIAGNOSIS — Z66 DO NOT RESUSCITATE: ICD-10-CM

## 2017-09-16 DIAGNOSIS — E11.69 HYPERLIPIDEMIA DUE TO TYPE 2 DIABETES MELLITUS: ICD-10-CM

## 2017-09-16 DIAGNOSIS — E03.9 HYPOTHYROIDISM, UNSPECIFIED TYPE: ICD-10-CM

## 2017-09-16 DIAGNOSIS — E83.59 OTHER DISORDERS OF CALCIUM METABOLISM: ICD-10-CM

## 2017-09-16 DIAGNOSIS — E11.40 CONTROLLED TYPE 2 DIABETES MELLITUS WITH DIABETIC NEUROPATHY, WITHOUT LONG-TERM CURRENT USE OF INSULIN: ICD-10-CM

## 2017-09-16 DIAGNOSIS — R91.1 PULMONARY NODULE: ICD-10-CM

## 2017-09-16 DIAGNOSIS — D69.2 SENILE PURPURA: ICD-10-CM

## 2017-09-16 LAB
25(OH)D3+25(OH)D2 SERPL-MCNC: 119 NG/ML
ALBUMIN SERPL BCP-MCNC: 3.6 G/DL
ALP SERPL-CCNC: 78 U/L
ALT SERPL W/O P-5'-P-CCNC: 22 U/L
ANION GAP SERPL CALC-SCNC: 13 MMOL/L
AST SERPL-CCNC: 26 U/L
BASOPHILS # BLD AUTO: 0.04 K/UL
BASOPHILS NFR BLD: 0.4 %
BILIRUB SERPL-MCNC: 0.3 MG/DL
BUN SERPL-MCNC: 25 MG/DL
CALCIUM SERPL-MCNC: 10.8 MG/DL
CHLORIDE SERPL-SCNC: 104 MMOL/L
CHOLEST SERPL-MCNC: 180 MG/DL
CHOLEST/HDLC SERPL: 3.1 {RATIO}
CO2 SERPL-SCNC: 24 MMOL/L
CREAT SERPL-MCNC: 1.1 MG/DL
DIFFERENTIAL METHOD: NORMAL
EOSINOPHIL # BLD AUTO: 0.3 K/UL
EOSINOPHIL NFR BLD: 3.1 %
ERYTHROCYTE [DISTWIDTH] IN BLOOD BY AUTOMATED COUNT: 13.7 %
EST. GFR  (AFRICAN AMERICAN): 56.4 ML/MIN/1.73 M^2
EST. GFR  (NON AFRICAN AMERICAN): 48.9 ML/MIN/1.73 M^2
ESTIMATED AVG GLUCOSE: 134 MG/DL
GLUCOSE SERPL-MCNC: 100 MG/DL
HBA1C MFR BLD HPLC: 6.3 %
HCT VFR BLD AUTO: 42.6 %
HDLC SERPL-MCNC: 59 MG/DL
HDLC SERPL: 32.8 %
HGB BLD-MCNC: 14.4 G/DL
LDLC SERPL CALC-MCNC: 92.6 MG/DL
LYMPHOCYTES # BLD AUTO: 2.4 K/UL
LYMPHOCYTES NFR BLD: 26.1 %
MCH RBC QN AUTO: 29.8 PG
MCHC RBC AUTO-ENTMCNC: 33.8 G/DL
MCV RBC AUTO: 88 FL
MONOCYTES # BLD AUTO: 1 K/UL
MONOCYTES NFR BLD: 10.7 %
NEUTROPHILS # BLD AUTO: 5.4 K/UL
NEUTROPHILS NFR BLD: 59.3 %
NONHDLC SERPL-MCNC: 121 MG/DL
PLATELET # BLD AUTO: 194 K/UL
PMV BLD AUTO: 11.2 FL
POTASSIUM SERPL-SCNC: 4.2 MMOL/L
PROT SERPL-MCNC: 8 G/DL
RBC # BLD AUTO: 4.84 M/UL
SODIUM SERPL-SCNC: 141 MMOL/L
T4 FREE SERPL-MCNC: 1.37 NG/DL
TRIGL SERPL-MCNC: 142 MG/DL
WBC # BLD AUTO: 9.08 K/UL

## 2017-09-16 PROCEDURE — 99499 UNLISTED E&M SERVICE: CPT | Mod: S$GLB,,, | Performed by: INTERNAL MEDICINE

## 2017-09-16 PROCEDURE — 36415 COLL VENOUS BLD VENIPUNCTURE: CPT

## 2017-09-16 PROCEDURE — 3078F DIAST BP <80 MM HG: CPT | Mod: S$GLB,,, | Performed by: INTERNAL MEDICINE

## 2017-09-16 PROCEDURE — 82306 VITAMIN D 25 HYDROXY: CPT

## 2017-09-16 PROCEDURE — 84439 ASSAY OF FREE THYROXINE: CPT

## 2017-09-16 PROCEDURE — 1157F ADVNC CARE PLAN IN RCRD: CPT | Mod: S$GLB,,, | Performed by: INTERNAL MEDICINE

## 2017-09-16 PROCEDURE — 99215 OFFICE O/P EST HI 40 MIN: CPT | Mod: S$GLB,,, | Performed by: INTERNAL MEDICINE

## 2017-09-16 PROCEDURE — 3008F BODY MASS INDEX DOCD: CPT | Mod: S$GLB,,, | Performed by: INTERNAL MEDICINE

## 2017-09-16 PROCEDURE — 1126F AMNT PAIN NOTED NONE PRSNT: CPT | Mod: S$GLB,,, | Performed by: INTERNAL MEDICINE

## 2017-09-16 PROCEDURE — 80053 COMPREHEN METABOLIC PANEL: CPT

## 2017-09-16 PROCEDURE — 99999 PR PBB SHADOW E&M-EST. PATIENT-LVL III: CPT | Mod: PBBFAC,,, | Performed by: INTERNAL MEDICINE

## 2017-09-16 PROCEDURE — 1159F MED LIST DOCD IN RCRD: CPT | Mod: S$GLB,,, | Performed by: INTERNAL MEDICINE

## 2017-09-16 PROCEDURE — 83036 HEMOGLOBIN GLYCOSYLATED A1C: CPT

## 2017-09-16 PROCEDURE — 3074F SYST BP LT 130 MM HG: CPT | Mod: S$GLB,,, | Performed by: INTERNAL MEDICINE

## 2017-09-16 PROCEDURE — 80061 LIPID PANEL: CPT

## 2017-09-16 PROCEDURE — G0008 ADMIN INFLUENZA VIRUS VAC: HCPCS | Mod: S$GLB,,, | Performed by: INTERNAL MEDICINE

## 2017-09-16 PROCEDURE — 90662 IIV NO PRSV INCREASED AG IM: CPT | Mod: S$GLB,,, | Performed by: INTERNAL MEDICINE

## 2017-09-16 PROCEDURE — 85025 COMPLETE CBC W/AUTO DIFF WBC: CPT

## 2017-09-16 NOTE — PATIENT INSTRUCTIONS
TODAY:  - geriatric driving limitations: no driving at night, during rush hour, long distances, unfamiliar cities  - think about a plan for when it's not safe to live alone  - usefulness of a neuropysch evaluation for dementia  - need paperwork for PoA, DNR order, living will  - pharmacy for pill packing  - flu vaccine  - labs today

## 2017-09-16 NOTE — PROGRESS NOTES
"Primary Care Provider Appointment    Subjective:      Patient ID: Kayla Sanchez is a 77 y.o. female with HLD, T2DM, HTN, s/p corneal graft transplant complications    Chief Complaint: Memory Loss    Patient here with her daughters today, with concerns for memory loss. She endorses numerous examples (corroborated by her daughters) about short term memory issues.    Have not been checking sugars, but eats healthy. Occasionally eats a cookie, she does not exercise. Her last exercise was one year ago. She endorses medication compliance, she packs her meds into AM and PM pill trays.    Her BP is well-controlled in clinic today.     Patient states she has an acute cough for past 2 days that "went into my lungs."    She endorses short-term memory loss. She is here to finalize all paperwork for DNR and PoA discrepencies in her chart. She states, "pull the plug."    Past Surgical History:   Procedure Laterality Date    ADENOIDECTOMY      APPENDECTOMY      CATARACT EXTRACTION      CHOLECYSTECTOMY      CORNEAL TRANSPLANT      DSEK OU      EYE SURGERY Bilateral     corneal transplant    THYROID SURGERY      goiter removal    TONSILLECTOMY         Past Medical History:   Diagnosis Date    Arthritis     Diabetes mellitus     Diabetes mellitus type II     GERD (gastroesophageal reflux disease)     Glaucoma     History of migraine headaches     Hyperlipidemia     Hypertension     Hypothyroidism     Type 2 diabetes mellitus with renal manifestations 1/20/2016       Review of Systems   Constitutional: Negative for activity change, appetite change and unexpected weight change.   Respiratory: Negative for cough and shortness of breath.    Cardiovascular: Negative for chest pain and leg swelling.   Musculoskeletal: Negative for back pain and gait problem.   Hematological: Bruises/bleeds easily.   Psychiatric/Behavioral: Positive for confusion and decreased concentration. Negative for agitation and behavioral " "problems. The patient is not hyperactive.        Objective:   /71 (BP Location: Left arm, Patient Position: Sitting)   Pulse (!) 59   Temp 98.3 °F (36.8 °C) (Oral)   Ht 5' 3" (1.6 m)   Wt 70 kg (154 lb 5.2 oz)   BMI 27.34 kg/m²     Physical Exam   Constitutional: She is oriented to person, place, and time. She appears well-developed and well-nourished.   obese   HENT:   Head: Normocephalic and atraumatic.   Neck: Normal range of motion.   Cardiovascular:   bradycardic   Pulmonary/Chest: Effort normal.   Abdominal:   obese   Musculoskeletal: She exhibits no edema or deformity.   Neurological: She is alert and oriented to person, place, and time.   Skin: Skin is warm.   Ecchymoses and purpura on UE bilaterally   Psychiatric: She has a normal mood and affect.   Decreased short-term memory   Vitals reviewed.              Lab Results   Component Value Date    WBC 9.08 09/16/2017    HGB 14.4 09/16/2017    HCT 42.6 09/16/2017     09/16/2017    CHOL 180 09/16/2017    TRIG 142 09/16/2017    HDL 59 09/16/2017    ALT 22 09/16/2017    AST 26 09/16/2017     09/16/2017    K 4.2 09/16/2017     09/16/2017    CREATININE 1.1 09/16/2017    BUN 25 (H) 09/16/2017    CO2 24 09/16/2017    TSH 5.813 (H) 12/15/2016    HGBA1C 6.3 (H) 09/16/2017         Assessment:   77 y.o. female with multiple co-morbid illnesses here to continue work-up of chronic issues notably HLD, T2DM, HTN, s/p corneal graft transplant complications.     Plan:     Problem List Items Addressed This Visit        Neuro    Memory disorder     · Will perform MMSE at next appt to determine if neuropsych testing is warranted  · Decreased patient appointment load  · Message schedulers to make ALL appointments at [facility]  · Reduce confusion while driving  · Patient with PoA forms today for daughter in [place]  · Will call daughter with med update on patient  · Symbol Ta [phone]  · Patient to fax a list of meds that she is taking  · Will remove meds " from med list once compliance is verified         Relevant Orders    Comprehensive metabolic panel (Completed)    CBC auto differential (Completed)    Vitamin D (Completed)       Pulmonary    Pulmonary nodule     Seen on 1/2016 CT scan, non-calcified, 5mm, monitor q12 mos, 77 pack year history  · CT chest ordered stable in 12/2016  · Monitor clinically and per patient request  · Patient and family not interested in scheduled annual monitoring            Cardiac/Vascular    Hypertension associated with diabetes     BP controlled, DM controlled  · Continue BB, diuretic, ACE  · Check electrolytes         Relevant Orders    Comprehensive metabolic panel (Completed)    CBC auto differential (Completed)    Hemoglobin A1c (Completed)    Hyperlipidemia due to type 2 diabetes mellitus     High ASCVD risk with DM  · continue statin  · Better diabetic control         Relevant Orders    Hemoglobin A1c (Completed)    Lipid panel (Completed)    Aortic atherosclerosis     Seen on CXR 11/11/13  · Continue statin  · BP control         Relevant Orders    Lipid panel (Completed)       Hematology    Senile purpura     Ecchymoses on UE, active in her home  · Advised mindfulness            Endocrine    Hypothyroidism     Some symptoms of memory loss, decreased mood. H/o thyroidectomy 10 years ago  · Free T4 normal in 12/2016, TSH elevated  · Check TSH, free T4 today         Relevant Orders    T4, free (Completed)       Palliative Care    Do not resuscitate     Patient with insight into her chronic conditions and does not want invasive interventions  · Patient wanting DNR           Other Visit Diagnoses     Controlled type 2 diabetes mellitus with diabetic neuropathy, without long-term current use of insulin        Relevant Orders    Influenza - High Dose (65+) (PF) (IM)    Hemoglobin A1c (Completed)    Other disorders of calcium metabolism         Relevant Orders    Vitamin D (Completed)          Health Maintenance       Date Due  Completion Date    TETANUS VACCINE 06/09/1958 ---    Lipid Panel 01/22/2017 1/22/2016- TODAY    Hemoglobin A1c 06/15/2017 12/15/2016- TODAY    Influenza Vaccine 08/01/2017 9/15/2016- TODAY    Override on 1/6/2015: Done    Override on 11/11/2013: Done    Foot Exam 12/15/2017 12/15/2016    Override on 1/20/2016: Done    Eye Exam 05/31/2018 5/31/2017    DEXA SCAN 12/16/2018 12/16/2016          Return in about 4 weeks (around 10/14/2017). . One hour spent with this patient today, half of that in counseling.    Annette Ruiz MD/MPH  Internal Medicine  [facility]  [phone]

## 2017-09-16 NOTE — Clinical Note
Gino Sotelo, Was this patient given her flu vaccine? It is not recorded as adminstered, so I can't close the encounter.  Thanks, THOR Ruiz MD/MPH Internal Medicine [facility] [phone]

## 2017-09-16 NOTE — ASSESSMENT & PLAN NOTE
A1c well-controlled, compliant with metformin and lifestyle changes  · Continue metformin and lifestyle changes

## 2017-09-16 NOTE — ASSESSMENT & PLAN NOTE
Patient with insight into her chronic conditions and does not want invasive interventions  · Patient wanting DNR

## 2017-09-16 NOTE — ASSESSMENT & PLAN NOTE
Seen on 1/2016 CT scan, non-calcified, 5mm, monitor q12 mos, 77 pack year history  · CT chest ordered stable in 12/2016  · Monitor clinically and per patient request  · Patient and family not interested in scheduled annual monitoring

## 2017-09-16 NOTE — ASSESSMENT & PLAN NOTE
Some symptoms of memory loss, decreased mood. H/o thyroidectomy 10 years ago  · Free T4 normal in 12/2016, TSH elevated  · Check TSH, free T4 today

## 2017-09-16 NOTE — ASSESSMENT & PLAN NOTE
· Will perform MMSE at next appt to determine if neuropsych testing is warranted  · Decreased patient appointment load  · Message schedulers to make ALL appointments at [facility] [place]  · Reduce confusion while driving  · Patient with PoA forms today for daughter in [place]  · Will call daughter with med update on patient  · Symbol Ta [phone]  · Patient to fax a list of meds that she is taking  · Will remove meds from med list once compliance is verified

## 2017-09-18 ENCOUNTER — TELEPHONE (OUTPATIENT)
Dept: PHARMACY | Facility: CLINIC | Age: 77
End: 2017-09-18

## 2017-09-20 ENCOUNTER — TELEPHONE (OUTPATIENT)
Dept: INTERNAL MEDICINE | Facility: CLINIC | Age: 77
End: 2017-09-20

## 2017-09-20 NOTE — TELEPHONE ENCOUNTER
Please advise patient and her daughters that her labs are stable (unchanged), but her vitamin D is high. Is she taking a vitamin D supplement? She should discontiune it if so.    Thanks,  KJ

## 2017-09-21 NOTE — TELEPHONE ENCOUNTER
Patient has been advised about unchanged lab work and to discontinue the Vitamin D supplement.    Patient verbalized great understanding.

## 2017-12-16 ENCOUNTER — OFFICE VISIT (OUTPATIENT)
Dept: URGENT CARE | Facility: CLINIC | Age: 77
End: 2017-12-16
Payer: MEDICARE

## 2017-12-16 VITALS
WEIGHT: 130 LBS | DIASTOLIC BLOOD PRESSURE: 80 MMHG | BODY MASS INDEX: 20.89 KG/M2 | OXYGEN SATURATION: 99 % | HEART RATE: 66 BPM | TEMPERATURE: 99 F | RESPIRATION RATE: 18 BRPM | SYSTOLIC BLOOD PRESSURE: 146 MMHG | HEIGHT: 66 IN

## 2017-12-16 DIAGNOSIS — S83.001A SUBLUXATION OF RIGHT PATELLA, INITIAL ENCOUNTER: Primary | ICD-10-CM

## 2017-12-16 DIAGNOSIS — M25.561 ACUTE PAIN OF RIGHT KNEE: ICD-10-CM

## 2017-12-16 PROCEDURE — 99202 OFFICE O/P NEW SF 15 MIN: CPT | Mod: S$GLB,,, | Performed by: NURSE PRACTITIONER

## 2017-12-16 NOTE — PATIENT INSTRUCTIONS
PLEASE FOLLOW UP WITH ORTHO AS DISCUSSED. A REFERRAL WAS SENT FOR YOUR CONVENIENCE  CALL [phone] TO SET UP AN APPOINTMENT    Common Kneecap (Patella) Problems  If the kneecap is off track even slightly (a tracking problem), it can cause uneven pressure on the back of the kneecap. This can cause pain and difficulty with movements, such as walking and going down stairs. Below are some common causes of kneecap pain.    Cartilage damage  Sometimes the cartilage on the back of the kneecap or in the groove of the thighbone is damaged. Damaged cartilage cant spread pressure evenly. Uneven pressure wears down the cartilage even further.   Dislocation  Sometimes a muscle or ligament in the knee is pulled the wrong way. Or the kneecap may be pushed too hard. Then the kneecap may move partly out of the groove (subluxation). It may even move completely out (dislocation).     Patellar tendinitis  Patellar tendinitis (jumpers knee) happens when the quadriceps muscles are overused or tight. During movement, the patellar tendon absorbs more shock than usual. The tendon becomes irritated or damaged.   Plica syndrome  Plica bands are tissue fibers that some people have near the kneecap. They usually cause no problems. But sometimes they can become irritated or inflamed.   Date Last Reviewed: 10/15/2015  © 3128-8161 [employer]. [address] [place] [address], [place] [address]. All rights reserved. This information is not intended as a substitute for professional medical care. Always follow your healthcare professional's instructions.

## 2017-12-16 NOTE — PROGRESS NOTES
"Subjective:       Patient ID: Kayla Sanchez is a 77 y.o. female.    Vitals:  height is 5' 6" (1.676 m) and weight is 59 kg (130 lb). Her oral temperature is 98.6 °F (37 °C). Her blood pressure is 146/80 (abnormal) and her pulse is 66. Her respiration is 18 and oxygen saturation is 99%.     Chief Complaint: Knee Pain (Right Knee)    Patient states she twisted her knee in her sleep.        Knee Pain    The incident occurred 2 days ago. The incident occurred at home. The injury mechanism was a twisting injury. The pain is present in the right thigh. The pain is at a severity of 6/10. The pain is moderate. The pain has been constant since onset. Associated symptoms include an inability to bear weight. She reports no foreign bodies present. The symptoms are aggravated by movement and weight bearing. She has tried nothing for the symptoms. The treatment provided no relief.     Review of Systems   Constitution: Negative for chills and fever.   HENT: Negative for sore throat.    Eyes: Negative for blurred vision.   Cardiovascular: Negative for chest pain.   Respiratory: Negative for shortness of breath.    Skin: Negative for rash.   Musculoskeletal: Positive for joint pain and joint swelling. Negative for back pain.   Gastrointestinal: Negative for abdominal pain, diarrhea, nausea and vomiting.   Neurological: Negative for headaches.   Psychiatric/Behavioral: The patient is not nervous/anxious.        Objective:      Physical Exam   Constitutional: She is oriented to person, place, and time. She appears well-developed and well-nourished. She is cooperative.  Non-toxic appearance. She does not appear ill. No distress.   HENT:   Head: Normocephalic and atraumatic.   Right Ear: Hearing, tympanic membrane, external ear and ear canal normal.   Left Ear: Hearing, tympanic membrane, external ear and ear canal normal.   Nose: Nose normal. No mucosal edema, rhinorrhea or nasal deformity. No epistaxis. Right sinus exhibits no " maxillary sinus tenderness and no frontal sinus tenderness. Left sinus exhibits no maxillary sinus tenderness and no frontal sinus tenderness.   Mouth/Throat: Uvula is midline, oropharynx is clear and moist and mucous membranes are normal. No trismus in the jaw. Normal dentition. No uvula swelling. No posterior oropharyngeal erythema.   Eyes: Conjunctivae and lids are normal. Right eye exhibits no discharge. Left eye exhibits no discharge. No scleral icterus.   Sclera clear bilat   Neck: Trachea normal, normal range of motion, full passive range of motion without pain and phonation normal. Neck supple.   Cardiovascular: Normal rate, regular rhythm, normal heart sounds, intact distal pulses and normal pulses.    Pulmonary/Chest: Effort normal and breath sounds normal. No respiratory distress.   Abdominal: Soft. Normal appearance and bowel sounds are normal. She exhibits no distension, no pulsatile midline mass and no mass. There is no tenderness.   Musculoskeletal: She exhibits no edema or deformity.        Right knee: She exhibits decreased range of motion, swelling (GENERALIZED), effusion and ecchymosis. She exhibits no erythema. No tenderness found.        Legs:  Neurological: She is alert and oriented to person, place, and time. She exhibits normal muscle tone. Coordination normal.   Skin: Skin is warm, dry and intact. She is not diaphoretic. No pallor.   Psychiatric: She has a normal mood and affect. Her speech is normal and behavior is normal. Judgment and thought content normal. She exhibits abnormal recent memory.   Nursing note and vitals reviewed.      X-Ray Knee 3 View Right 12/16/2017 None Specified          RESULTS:  Right knee exam was performed with AP, lateral, and patellar views. No prior exam.     The skeletal structures are intact. No fracture or dislocation is identified. The patella is subluxed laterally. Degenerative joint disease is evident with small spurring around the knee and severe  narrowing of the patellofemoral joint space. Tibiofemoral joint spaces are better preserved. No erosions are detected. Small joint effusion is present in suprapatellar area. Soft tissue swelling may be present along the anterior aspect of the knee  IMPRESSION:    DJD right knee .        Electronically signed by: DEQUAN BETH MD  Date:                                            12/16/17  Time:                                           14:44        Assessment:       1. Subluxation of right patella, initial encounter    2. Acute pain of right knee        Plan:       PLAN DISCUSSED WITH Dr. CONCEPCION  Subluxation of right patella, initial encounter  -     Ambulatory referral to Orthopedics    Acute pain of right knee  -     X-Ray Knee 3 View Right; Future; Expected date: 12/16/2017      Patient Instructions     PLEASE FOLLOW UP WITH ORTHO AS DISCUSSED. A REFERRAL WAS SENT FOR YOUR CONVENIENCE  CALL [phone] TO SET UP AN APPOINTMENT    Common Kneecap (Patella) Problems  If the kneecap is off track even slightly (a tracking problem), it can cause uneven pressure on the back of the kneecap. This can cause pain and difficulty with movements, such as walking and going down stairs. Below are some common causes of kneecap pain.    Cartilage damage  Sometimes the cartilage on the back of the kneecap or in the groove of the thighbone is damaged. Damaged cartilage cant spread pressure evenly. Uneven pressure wears down the cartilage even further.   Dislocation  Sometimes a muscle or ligament in the knee is pulled the wrong way. Or the kneecap may be pushed too hard. Then the kneecap may move partly out of the groove (subluxation). It may even move completely out (dislocation).     Patellar tendinitis  Patellar tendinitis (jumpers knee) happens when the quadriceps muscles are overused or tight. During movement, the patellar tendon absorbs more shock than usual. The tendon becomes irritated or damaged.   Plica  syndrome  Plica bands are tissue fibers that some people have near the kneecap. They usually cause no problems. But sometimes they can become irritated or inflamed.   Date Last Reviewed: 10/15/2015  © 0158-0242 [employer]. [address] [place] [address], [place] [address]. All rights reserved. This information is not intended as a substitute for professional medical care. Always follow your healthcare professional's instructions.

## 2017-12-20 ENCOUNTER — OFFICE VISIT (OUTPATIENT)
Dept: ORTHOPEDICS | Facility: CLINIC | Age: 77
End: 2017-12-20
Payer: MEDICARE

## 2017-12-20 ENCOUNTER — HOSPITAL ENCOUNTER (OUTPATIENT)
Dept: RADIOLOGY | Facility: HOSPITAL | Age: 77
Discharge: HOME | End: 2017-12-20
Attending: PHYSICIAN ASSISTANT
Payer: MEDICARE

## 2017-12-20 VITALS — BODY MASS INDEX: 26.82 KG/M2 | HEIGHT: 63 IN | WEIGHT: 151.38 LBS

## 2017-12-20 DIAGNOSIS — M25.561 RIGHT KNEE PAIN, UNSPECIFIED CHRONICITY: ICD-10-CM

## 2017-12-20 DIAGNOSIS — M17.11 OSTEOARTHRITIS OF RIGHT KNEE, UNSPECIFIED OSTEOARTHRITIS TYPE: Primary | ICD-10-CM

## 2017-12-20 PROCEDURE — 73560 X-RAY EXAM OF KNEE 1 OR 2: CPT | Mod: 26,50,, | Performed by: RADIOLOGY

## 2017-12-20 PROCEDURE — 99203 OFFICE O/P NEW LOW 30 MIN: CPT | Mod: S$GLB,,, | Performed by: PHYSICIAN ASSISTANT

## 2017-12-20 PROCEDURE — 99999 PR PBB SHADOW E&M-EST. PATIENT-LVL IV: CPT | Mod: PBBFAC,,, | Performed by: PHYSICIAN ASSISTANT

## 2017-12-20 PROCEDURE — 73560 X-RAY EXAM OF KNEE 1 OR 2: CPT | Mod: 50,TC

## 2017-12-20 NOTE — PROGRESS NOTES
Subjective:      Patient ID: Kayla Sanchez is a 77 y.o. female.    Chief Complaint: No chief complaint on file.    HPI  77 year old female presents with chief complaint of right knee pain x 1 week. She woke up one morning with knee pain. She doesn't recall trauma. Pain is worse with moving it. She is wearing a knee brace which helps. Aspirin gives mild relief. She reports swelling. She denies popping and giving way. She does not use assistive devices.   Review of Systems   Constitution: Negative for chills, fever and night sweats.   Cardiovascular: Negative for chest pain.   Respiratory: Negative for cough and shortness of breath.    Hematologic/Lymphatic: Does not bruise/bleed easily.   Skin: Negative for color change.   Gastrointestinal: Negative for heartburn.   Genitourinary: Negative for dysuria.   Neurological: Negative for numbness and paresthesias.   Psychiatric/Behavioral: Negative for altered mental status.   Allergic/Immunologic: Negative for persistent infections.         Objective:            General    Vitals reviewed.  Constitutional: She is oriented to person, place, and time. She appears well-developed and well-nourished.   Cardiovascular: Normal rate.    Neurological: She is alert and oriented to person, place, and time.             Right Knee Exam:  ROM 0-125 degrees  Small effusion  No warmth or erythema  TTP medial joint line      X-ray: ordered and reviewed by myself. DJD present.       Assessment:       Encounter Diagnosis   Name Primary?    Osteoarthritis of right knee, unspecified osteoarthritis type Yes          Plan:       Discussed treatment options with patient. HEP given. Ice and elevate knee. She will take aleve BID x 2 weeks. RTC if symptoms worsen or do not improve and we will consider injection.

## 2018-02-07 ENCOUNTER — PES CALL (OUTPATIENT)
Dept: ADMINISTRATIVE | Facility: CLINIC | Age: 78
End: 2018-02-07

## 2018-03-09 DIAGNOSIS — E78.2 MIXED HYPERLIPIDEMIA: ICD-10-CM

## 2018-03-09 DIAGNOSIS — I10 ESSENTIAL HYPERTENSION: ICD-10-CM

## 2018-03-09 DIAGNOSIS — E11.9 TYPE 2 DIABETES MELLITUS WITHOUT COMPLICATION, WITHOUT LONG-TERM CURRENT USE OF INSULIN: ICD-10-CM

## 2018-03-09 DIAGNOSIS — E03.9 HYPOTHYROIDISM, UNSPECIFIED TYPE: ICD-10-CM

## 2018-03-12 RX ORDER — METFORMIN HYDROCHLORIDE 1000 MG/1
TABLET ORAL
Qty: 180 TABLET | Refills: 3 | Status: SHIPPED | OUTPATIENT
Start: 2018-03-12 | End: 2020-10-14

## 2018-03-12 RX ORDER — LEVOTHYROXINE SODIUM 100 UG/1
TABLET ORAL
Qty: 90 TABLET | Refills: 3 | Status: SHIPPED | OUTPATIENT
Start: 2018-03-12 | End: 2020-10-14

## 2018-03-12 RX ORDER — ATENOLOL AND CHLORTHALIDONE TABLET 50; 25 MG/1; MG/1
TABLET ORAL
Qty: 90 TABLET | Refills: 3 | Status: SHIPPED | OUTPATIENT
Start: 2018-03-12 | End: 2020-10-14

## 2018-03-12 RX ORDER — SIMVASTATIN 40 MG/1
40 TABLET, FILM COATED ORAL NIGHTLY
Qty: 90 TABLET | Refills: 3 | Status: SHIPPED | OUTPATIENT
Start: 2018-03-12 | End: 2020-10-14

## 2018-03-12 NOTE — TELEPHONE ENCOUNTER
Please let patient know that metformin, levothyroxine, tenoretic and zocor were all sent to [facility] for a one year supply.    Does she want a follow-up appt?    Thanks,  KJ

## 2018-05-21 ENCOUNTER — PES CALL (OUTPATIENT)
Dept: ADMINISTRATIVE | Facility: CLINIC | Age: 78
End: 2018-05-21

## 2018-06-27 ENCOUNTER — OFFICE VISIT (OUTPATIENT)
Dept: OPTOMETRY | Facility: CLINIC | Age: 78
End: 2018-06-27
Payer: COMMERCIAL

## 2018-06-27 DIAGNOSIS — H40.33X3 GLAUCOMA OF BOTH EYES ASSOCIATED WITH OCULAR TRAUMA, SEVERE STAGE: ICD-10-CM

## 2018-06-27 DIAGNOSIS — T85.398D MECHANICAL COMPLICATION DUE TO CORNEAL GRAFT, SUBSEQUENT ENCOUNTER: ICD-10-CM

## 2018-06-27 DIAGNOSIS — H52.7 REFRACTIVE ERROR: ICD-10-CM

## 2018-06-27 DIAGNOSIS — E11.22 TYPE 2 DIABETES MELLITUS WITH STAGE 2 CHRONIC KIDNEY DISEASE, WITHOUT LONG-TERM CURRENT USE OF INSULIN: Primary | ICD-10-CM

## 2018-06-27 DIAGNOSIS — N18.2 TYPE 2 DIABETES MELLITUS WITH STAGE 2 CHRONIC KIDNEY DISEASE, WITHOUT LONG-TERM CURRENT USE OF INSULIN: Primary | ICD-10-CM

## 2018-06-27 DIAGNOSIS — E11.9 TYPE 2 DIABETES MELLITUS WITHOUT RETINOPATHY: ICD-10-CM

## 2018-06-27 PROCEDURE — 92015 DETERMINE REFRACTIVE STATE: CPT | Mod: S$GLB,,, | Performed by: OPTOMETRIST

## 2018-06-27 PROCEDURE — 99999 PR PBB SHADOW E&M-EST. PATIENT-LVL II: CPT | Mod: PBBFAC,,, | Performed by: OPTOMETRIST

## 2018-06-27 PROCEDURE — 99499 UNLISTED E&M SERVICE: CPT | Mod: S$GLB,,, | Performed by: OPTOMETRIST

## 2018-06-27 PROCEDURE — 92014 COMPRE OPH EXAM EST PT 1/>: CPT | Mod: S$GLB,,, | Performed by: OPTOMETRIST

## 2018-06-27 RX ORDER — DORZOLAMIDE HYDROCHLORIDE AND TIMOLOL MALEATE 20; 5 MG/ML; MG/ML
1 SOLUTION/ DROPS OPHTHALMIC 2 TIMES DAILY
Qty: 10 ML | Refills: 6 | Status: SHIPPED | OUTPATIENT
Start: 2018-06-27 | End: 2018-11-28 | Stop reason: SDUPTHER

## 2018-06-27 NOTE — PROGRESS NOTES
ESTRELLA HI 05/2017 with Dr. Crane. OS looks blurred, sometimes clears up.    Glasses about 3 yrs. Old, Needs new ones.  Not using any drops.  Not sure which drops and how much to use  Prev probs with drops stinging.     Last edited by Osmani Patterson, OD on 6/27/2018  3:09 PM. (History)            Assessment /Plan     For exam results, see Encounter Report.    Type 2 diabetes mellitus with stage 2 chronic kidney disease, without long-term current use of insulin    Mechanical complication due to corneal graft, subsequent encounter  -     cyclosporine 2% opht drop; Place 1 drop into both eyes 2 (two) times daily.  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  Dispense: 10 mL; Refill: 6    Refractive error    Type 2 diabetes mellitus without retinopathy      1. No diabetic retinopathy, no csme. Return in 1 year for dilated eye exam.  2. DSEK clear, restart cyclosporine drops 2x/day, pt is steroid responder, follow up with Rosa Maria for next appt,  3. Restart Glaucoma drop cosopt 2x/day, use art tears before med drop to help with stinging. RTC with Claire for follow up on glaucoma care.   4. Spec Rx given. Different lens options discussed with patient.        5. No diabetic retinopathy, no csme. Return in 1 year for dilated eye exam.

## 2018-11-28 ENCOUNTER — TELEPHONE (OUTPATIENT)
Dept: INTERNAL MEDICINE | Facility: CLINIC | Age: 78
End: 2018-11-28

## 2018-11-28 ENCOUNTER — OFFICE VISIT (OUTPATIENT)
Dept: OPHTHALMOLOGY | Facility: CLINIC | Age: 78
End: 2018-11-28
Payer: MEDICARE

## 2018-11-28 DIAGNOSIS — H40.33X3 GLAUCOMA OF BOTH EYES ASSOCIATED WITH OCULAR TRAUMA, SEVERE STAGE: ICD-10-CM

## 2018-11-28 DIAGNOSIS — Z94.7 STATUS POST CORNEAL TRANSPLANT: ICD-10-CM

## 2018-11-28 DIAGNOSIS — Z96.1 STATUS POST CATARACT EXTRACTION AND INSERTION OF INTRAOCULAR LENS, UNSPECIFIED LATERALITY: ICD-10-CM

## 2018-11-28 DIAGNOSIS — Z98.49 STATUS POST CATARACT EXTRACTION AND INSERTION OF INTRAOCULAR LENS, UNSPECIFIED LATERALITY: ICD-10-CM

## 2018-11-28 DIAGNOSIS — H40.32X3 GLAUCOMA OF LEFT EYE ASSOCIATED WITH OCULAR TRAUMA, SEVERE STAGE: Primary | ICD-10-CM

## 2018-11-28 DIAGNOSIS — H04.123 BILATERAL DRY EYES: ICD-10-CM

## 2018-11-28 DIAGNOSIS — Z91.199 PATIENT NONADHERENCE: ICD-10-CM

## 2018-11-28 PROCEDURE — 92020 GONIOSCOPY: CPT | Mod: S$GLB,,, | Performed by: OPHTHALMOLOGY

## 2018-11-28 PROCEDURE — 99999 PR PBB SHADOW E&M-EST. PATIENT-LVL III: CPT | Mod: PBBFAC,,, | Performed by: OPHTHALMOLOGY

## 2018-11-28 PROCEDURE — 92014 COMPRE OPH EXAM EST PT 1/>: CPT | Mod: S$GLB,,, | Performed by: OPHTHALMOLOGY

## 2018-11-28 RX ORDER — DORZOLAMIDE HYDROCHLORIDE AND TIMOLOL MALEATE 20; 5 MG/ML; MG/ML
1 SOLUTION/ DROPS OPHTHALMIC 2 TIMES DAILY
Qty: 10 ML | Refills: 6 | Status: SHIPPED | OUTPATIENT
Start: 2018-11-28 | End: 2018-12-26 | Stop reason: SDUPTHER

## 2018-11-28 RX ORDER — DORZOLAMIDE HYDROCHLORIDE AND TIMOLOL MALEATE 20; 5 MG/ML; MG/ML
1 SOLUTION/ DROPS OPHTHALMIC 2 TIMES DAILY
Qty: 10 ML | Refills: 6 | Status: SHIPPED | OUTPATIENT
Start: 2018-11-28 | End: 2018-11-28 | Stop reason: SDUPTHER

## 2018-11-28 RX ORDER — LATANOPROST 50 UG/ML
1 SOLUTION/ DROPS OPHTHALMIC NIGHTLY
COMMUNITY
End: 2018-11-28 | Stop reason: SDUPTHER

## 2018-11-28 RX ORDER — LATANOPROST 50 UG/ML
1 SOLUTION/ DROPS OPHTHALMIC NIGHTLY
Qty: 2.5 ML | Refills: 12 | Status: SHIPPED | OUTPATIENT
Start: 2018-11-28 | End: 2019-09-29 | Stop reason: SDUPTHER

## 2018-11-28 NOTE — PROGRESS NOTES
HPI     Glaucoma      Additional comments: overdue iop chk              Comments     Status post cataract extraction and insertion of intraocular lens,   unspecified laterality  Corneal graft malfunction, sequela  Bilateral dry eyes - Both Eyes  Traumatic glaucoma - Left Eye  Steroid-induced glaucoma - Left Eye          Last edited by Agapito Downey on 11/28/2018  2:42 PM. (History)            Assessment /Plan     For exam results, see Encounter Report.    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, severe stage    Bilateral dry eyes - Both Eyes    Status post cataract extraction and insertion of intraocular lens, unspecified laterality    Status post corneal transplant    Patient nonadherence      Patient with Daughter in law  Living with son's family    Lost to FU sc meds x >months 3/27/2017 --> 11/28/2018 19 // 39    Advanced POAG / SP DSEK OU  Steroid Induced glaucoma -left eye     CCT  685 // 726    Low teens    Both eyes  --> Non-adherence x months --> discussed with Daughter in law 11/28/2018  Cosopt BID --> restart OS  Xal q day --> start and discussed SE, use and expectations with Q & A    Alphagan BID --> fatigue / Boggy Sterling OU --> hold --> patient takes as substitute for CS --> stop this    Left eye  CS BID --> difficulty with cost --> marked steroid responder --> Off x months / Hold    Laser Trabeculoplasty  left eye 3/12/2014    PC IOL OU  SP YAG CAP OS 3/14/2017      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 Signs and symptoms of graft rejection reviewed.   -Hx rejection OS in the past off steroids    Dry Eye Syndrome: discussed use of warm compresses, preserved & non-preserved artificial tears, gel and PM ointment options.        Plan   RTC  1 weeks IOP   --> check adherence  RTC sooner prn with good understanding

## 2018-11-28 NOTE — Clinical Note
Patient presents / lost to fuNon-adherent 2/2 memory / dementiaSeems to living with Daughter in law now

## 2018-12-05 ENCOUNTER — OFFICE VISIT (OUTPATIENT)
Dept: OPHTHALMOLOGY | Facility: CLINIC | Age: 78
End: 2018-12-05
Payer: MEDICARE

## 2018-12-05 DIAGNOSIS — Z96.1 STATUS POST CATARACT EXTRACTION AND INSERTION OF INTRAOCULAR LENS, UNSPECIFIED LATERALITY: ICD-10-CM

## 2018-12-05 DIAGNOSIS — Z94.7 STATUS POST CORNEAL TRANSPLANT: ICD-10-CM

## 2018-12-05 DIAGNOSIS — H04.123 BILATERAL DRY EYES: ICD-10-CM

## 2018-12-05 DIAGNOSIS — Z91.199 PATIENT NONADHERENCE: Primary | ICD-10-CM

## 2018-12-05 DIAGNOSIS — H40.60X0 STEROID-INDUCED GLAUCOMA: ICD-10-CM

## 2018-12-05 DIAGNOSIS — T38.0X5A STEROID-INDUCED GLAUCOMA: ICD-10-CM

## 2018-12-05 DIAGNOSIS — Z98.49 STATUS POST CATARACT EXTRACTION AND INSERTION OF INTRAOCULAR LENS, UNSPECIFIED LATERALITY: ICD-10-CM

## 2018-12-05 DIAGNOSIS — H40.32X3 GLAUCOMA OF LEFT EYE ASSOCIATED WITH OCULAR TRAUMA, SEVERE STAGE: ICD-10-CM

## 2018-12-05 PROCEDURE — 92012 INTRM OPH EXAM EST PATIENT: CPT | Mod: S$GLB,,, | Performed by: OPHTHALMOLOGY

## 2018-12-05 PROCEDURE — 99999 PR PBB SHADOW E&M-EST. PATIENT-LVL II: CPT | Mod: PBBFAC,,, | Performed by: OPHTHALMOLOGY

## 2018-12-07 NOTE — PROGRESS NOTES
HPI     Patient with Daughter in law  Never got Cosopt    Last edited by Leandro Rangel MD on 12/7/2018  5:20 PM. (History)            Assessment /Plan     For exam results, see Encounter Report.    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, severe stage    Status post cataract extraction and insertion of intraocular lens, unspecified laterality    Status post corneal transplant    Steroid-induced glaucoma - Left Eye    Bilateral dry eyes - Both Eyes      Patient with Daughter in law  Living with son's family    Lost to FU sc meds x >months 3/27/2017 --> 11/28/2018 20 // 39    Advanced POAG / SP DSEK OU  Steroid Induced glaucoma -left eye     CCT  685 // 726    Low teens    Both eyes  --> Non-adherence x months --> re-discussed with Daughter in law 11/28/2018  Cosopt BID --> never got --> started in clinic   Xal q day    Alphagan BID --> fatigue / Boggy Sterling OU --> hold --> patient takes as substitute for CS --> stop this    Left eye  CS BID --> difficulty with cost --> marked steroid responder --> Off x months / Hold    Laser Trabeculoplasty  left eye 3/12/2014    PC IOL OU  SP YAG CAP OS 3/14/2017      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 Signs and symptoms of graft rejection reviewed.   -Hx rejection OS in the past off steroids    Dry Eye Syndrome: discussed use of warm compresses, preserved & non-preserved artificial tears, gel and PM ointment options.        Plan   RTC  3 weeks IOP   --> check adherence  RTC sooner prn with good understanding

## 2018-12-16 NOTE — PROGRESS NOTES
Assessment /Plan     For exam results, see Encounter Report.    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, severe stage    Status post corneal transplant    Status post cataract extraction and insertion of intraocular lens, unspecified laterality    Patient nonadherence    Bilateral dry eyes - Both Eyes      Patient with Daughter in law  Living with son's family    Lost to FU sc meds x >months 3/27/2017 --> 11/28/2018 20 // 39    Advanced POAG / SP DSEK OU  Steroid Induced glaucoma -left eye     CCT  685 // 726    Low teens    Both eyes  --> Non-adherence x months --> re-discussed with Daughter in law 11/28/2018  Cosopt BID --> never got --> started in clinic   Xal q day    Alphagan BID --> fatigue / Boggy Sterling OU --> hold --> patient takes as substitute for CS --> stop this    Left eye  CS BID --> difficulty with cost --> marked steroid responder --> Off x months / Hold    Laser Trabeculoplasty  left eye 3/12/2014    PC IOL OU  SP YAG CAP OS 3/14/2017      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 Signs and symptoms of graft rejection reviewed.   -Hx rejection OS in the past off steroids    Dry Eye Syndrome: discussed use of warm compresses, preserved & non-preserved artificial tears, gel and PM ointment options.        Plan   RTC  2 months IOP   --> check adherence  RTC sooner prn with good understanding

## 2018-12-26 ENCOUNTER — OFFICE VISIT (OUTPATIENT)
Dept: OPHTHALMOLOGY | Facility: CLINIC | Age: 78
End: 2018-12-26
Payer: MEDICARE

## 2018-12-26 DIAGNOSIS — H40.33X3 GLAUCOMA OF BOTH EYES ASSOCIATED WITH OCULAR TRAUMA, SEVERE STAGE: ICD-10-CM

## 2018-12-26 DIAGNOSIS — Z96.1 STATUS POST CATARACT EXTRACTION AND INSERTION OF INTRAOCULAR LENS, UNSPECIFIED LATERALITY: ICD-10-CM

## 2018-12-26 DIAGNOSIS — Z94.7 STATUS POST CORNEAL TRANSPLANT: ICD-10-CM

## 2018-12-26 DIAGNOSIS — H04.123 BILATERAL DRY EYES: ICD-10-CM

## 2018-12-26 DIAGNOSIS — H40.32X3 GLAUCOMA OF LEFT EYE ASSOCIATED WITH OCULAR TRAUMA, SEVERE STAGE: Primary | ICD-10-CM

## 2018-12-26 DIAGNOSIS — Z91.199 PATIENT NONADHERENCE: ICD-10-CM

## 2018-12-26 DIAGNOSIS — Z98.49 STATUS POST CATARACT EXTRACTION AND INSERTION OF INTRAOCULAR LENS, UNSPECIFIED LATERALITY: ICD-10-CM

## 2018-12-26 PROCEDURE — 99999 PR PBB SHADOW E&M-EST. PATIENT-LVL II: CPT | Mod: PBBFAC,,, | Performed by: OPHTHALMOLOGY

## 2018-12-26 PROCEDURE — 92012 INTRM OPH EXAM EST PATIENT: CPT | Mod: S$GLB,,, | Performed by: OPHTHALMOLOGY

## 2018-12-26 RX ORDER — DORZOLAMIDE HYDROCHLORIDE AND TIMOLOL MALEATE 20; 5 MG/ML; MG/ML
1 SOLUTION/ DROPS OPHTHALMIC 2 TIMES DAILY
Qty: 10 ML | Refills: 4 | Status: SHIPPED | OUTPATIENT
Start: 2018-12-26 | End: 2020-10-14

## 2019-02-13 ENCOUNTER — OFFICE VISIT (OUTPATIENT)
Dept: URGENT CARE | Facility: CLINIC | Age: 79
End: 2019-02-13
Payer: MEDICARE

## 2019-02-13 VITALS
WEIGHT: 151 LBS | RESPIRATION RATE: 18 BRPM | HEART RATE: 95 BPM | HEIGHT: 63 IN | DIASTOLIC BLOOD PRESSURE: 85 MMHG | SYSTOLIC BLOOD PRESSURE: 145 MMHG | BODY MASS INDEX: 26.75 KG/M2 | OXYGEN SATURATION: 98 % | TEMPERATURE: 98 F

## 2019-02-13 DIAGNOSIS — L03.113 CELLULITIS OF RIGHT UPPER EXTREMITY: Primary | ICD-10-CM

## 2019-02-13 DIAGNOSIS — M25.531 RIGHT WRIST PAIN: ICD-10-CM

## 2019-02-13 PROCEDURE — 3079F PR MOST RECENT DIASTOLIC BLOOD PRESSURE 80-89 MM HG: ICD-10-PCS | Mod: CPTII,S$GLB,, | Performed by: PHYSICIAN ASSISTANT

## 2019-02-13 PROCEDURE — 99214 OFFICE O/P EST MOD 30 MIN: CPT | Mod: S$GLB,,, | Performed by: PHYSICIAN ASSISTANT

## 2019-02-13 PROCEDURE — 73110 XR WRIST COMPLETE 3 VIEWS RIGHT: ICD-10-PCS | Mod: RT,S$GLB,, | Performed by: RADIOLOGY

## 2019-02-13 PROCEDURE — 3077F PR MOST RECENT SYSTOLIC BLOOD PRESSURE >= 140 MM HG: ICD-10-PCS | Mod: CPTII,S$GLB,, | Performed by: PHYSICIAN ASSISTANT

## 2019-02-13 PROCEDURE — 73110 X-RAY EXAM OF WRIST: CPT | Mod: RT,S$GLB,, | Performed by: RADIOLOGY

## 2019-02-13 PROCEDURE — 1101F PT FALLS ASSESS-DOCD LE1/YR: CPT | Mod: CPTII,S$GLB,, | Performed by: PHYSICIAN ASSISTANT

## 2019-02-13 PROCEDURE — 99214 PR OFFICE/OUTPT VISIT, EST, LEVL IV, 30-39 MIN: ICD-10-PCS | Mod: S$GLB,,, | Performed by: PHYSICIAN ASSISTANT

## 2019-02-13 PROCEDURE — 3079F DIAST BP 80-89 MM HG: CPT | Mod: CPTII,S$GLB,, | Performed by: PHYSICIAN ASSISTANT

## 2019-02-13 PROCEDURE — 1101F PR PT FALLS ASSESS DOC 0-1 FALLS W/OUT INJ PAST YR: ICD-10-PCS | Mod: CPTII,S$GLB,, | Performed by: PHYSICIAN ASSISTANT

## 2019-02-13 PROCEDURE — 3077F SYST BP >= 140 MM HG: CPT | Mod: CPTII,S$GLB,, | Performed by: PHYSICIAN ASSISTANT

## 2019-02-13 RX ORDER — CEPHALEXIN 500 MG/1
500 CAPSULE ORAL 4 TIMES DAILY
Qty: 40 CAPSULE | Refills: 0 | Status: SHIPPED | OUTPATIENT
Start: 2019-02-13 | End: 2019-02-23

## 2019-02-13 RX ORDER — PREDNISONE 20 MG/1
40 TABLET ORAL DAILY
Qty: 10 TABLET | Refills: 0 | Status: SHIPPED | OUTPATIENT
Start: 2019-02-13 | End: 2019-02-18

## 2019-02-14 NOTE — PROGRESS NOTES
"Subjective:       Patient ID: Kayla Sanchez is a 78 y.o. female.    Vitals:  height is 5' 3" (1.6 m) and weight is 68.5 kg (151 lb). Her oral temperature is 97.6 °F (36.4 °C). Her blood pressure is 145/85 (abnormal) and her pulse is 95. Her respiration is 18 and oxygen saturation is 98%.     Chief Complaint: Hand Pain    This is a 78 y.o. female who presents today with a chief complaint of right wrist pain and swelling. Pt is unsure of what happened. Went to bed 2 nights ago and woke up with pain and swelling.  She is right hand dominant.  No known trauma.  There is redness and warmth to the right wrist with swelling of the hand and wrist.      Hand Pain    The incident occurred 2 days ago. The incident occurred at home. The injury mechanism is unknown. The pain is present in the right hand. The quality of the pain is described as aching, stabbing and shooting. The pain does not radiate. The pain is at a severity of 7/10. The pain is moderate. The pain has been constant since the incident. Pertinent negatives include no chest pain. The symptoms are aggravated by movement. She has tried nothing for the symptoms. The treatment provided no relief.       Constitution: Negative for chills, fatigue and fever.   HENT: Negative for congestion and sore throat.    Neck: Negative for painful lymph nodes.   Cardiovascular: Negative for chest pain and leg swelling.   Eyes: Negative for double vision and blurred vision.   Respiratory: Negative for cough and shortness of breath.    Gastrointestinal: Negative for nausea, vomiting and diarrhea.   Genitourinary: Negative for dysuria, frequency, urgency and history of kidney stones.   Musculoskeletal: Positive for pain, joint pain and joint swelling. Negative for muscle cramps and muscle ache.   Skin: Negative for color change, pale, rash, erythema and bruising.   Allergic/Immunologic: Negative for seasonal allergies.   Neurological: Negative for dizziness, history of vertigo, " light-headedness, passing out and headaches.   Hematologic/Lymphatic: Negative for swollen lymph nodes.   Psychiatric/Behavioral: Negative for nervous/anxious, sleep disturbance and depression. The patient is not nervous/anxious.        Objective:      Physical Exam   Constitutional: She is oriented to person, place, and time. She appears well-developed and well-nourished.   HENT:   Head: Normocephalic and atraumatic.   Eyes: Conjunctivae are normal.   Neck: Normal range of motion. Neck supple. No thyromegaly present.   Cardiovascular: Normal rate and regular rhythm. Exam reveals no gallop and no friction rub.   No murmur heard.  Pulmonary/Chest: Effort normal and breath sounds normal. She has no wheezes. She has no rales.   Musculoskeletal:        Right wrist: She exhibits decreased range of motion, tenderness and swelling.        Right hand: She exhibits decreased range of motion and tenderness (thenar eminance).   Lymphadenopathy:     She has no cervical adenopathy.   Neurological: She is alert and oriented to person, place, and time.   Skin: Skin is warm and dry. No rash noted. No erythema.   Psychiatric: She has a normal mood and affect. Her behavior is normal. Judgment and thought content normal.   Nursing note and vitals reviewed.      7:06 PM - X-rays of the right wrist show no acute fracture or dislocation.  There is DJD.  This is a preliminary reading by me.  Final report by Radiology to follow.     X-ray Wrist Complete Right    Result Date: 2/13/2019  EXAMINATION: XR WRIST COMPLETE 3 VIEWS RIGHT CLINICAL HISTORY: Pain in right wrist TECHNIQUE: PA, lateral, and oblique views of the right wrist were performed. COMPARISON: None FINDINGS: No evidence of acute fracture, dislocation, or osseous destructive process.  Negative ulnar variance is seen.  Mild scattered degenerative changes and subchondral cystic change are seen within the wrist.  Additional degenerative changes are seen at the interphalangeal joint  of the thumb.     No acute osseous abnormality identified. Electronically signed by: Radha Khanna MD Date:    02/13/2019 Time:    19:08    Assessment:       1. Cellulitis of right upper extremity    2. Right wrist pain        Plan:         Cellulitis of right upper extremity  -     cephALEXin (KEFLEX) 500 MG capsule; Take 1 capsule (500 mg total) by mouth 4 (four) times daily. for 10 days  Dispense: 40 capsule; Refill: 0    Right wrist pain  -     X-Ray Wrist Complete Right; Future; Expected date: 02/13/2019  -     predniSONE (DELTASONE) 20 MG tablet; Take 2 tablets (40 mg total) by mouth once daily. for 5 days  Dispense: 10 tablet; Refill: 0  -     Ambulatory referral to Orthopedics        Kayla was seen today for hand pain.    Diagnoses and all orders for this visit:    Cellulitis of right upper extremity  -     cephALEXin (KEFLEX) 500 MG capsule; Take 1 capsule (500 mg total) by mouth 4 (four) times daily. for 10 days    Right wrist pain  -     X-Ray Wrist Complete Right; Future  -     predniSONE (DELTASONE) 20 MG tablet; Take 2 tablets (40 mg total) by mouth once daily. for 5 days  -     Ambulatory referral to Orthopedics      Patient Instructions   - Rest.    - Drink plenty of fluids.    - Tylenol or Ibuprofen as directed as needed for fever/pain.    - Alternate Tylenol and Ibuprofen as needed for fever/pain.  This means take Tylenol, then 3 hours later take Ibuprofen, then 3 hours after that you can take Tylenol again, then 3 hours later you can take Ibuprofen again, and continue as needed.  This way, the Tylenol is scheduled 6 hours apart and the Ibuprofen is scheduled 6 hours apart, but you are getting medicine every 3 hours if needed.  - Follow up with your PCP or specialty clinic as directed in the next 1-2 weeks if not improved or as needed.  You can call [phone] to schedule an appointment with the appropriate provider.    - Go to the ED if your symptoms worsen.  - You must understand that you  have received an Urgent Care treatment only and that you may be released before all of your medical problems are known or treated.   - You, the patient, will arrange for follow up care as instructed.   - If your condition worsens or fails to improve we recommend that you receive another evaluation at the ER immediately or contact your PCP to discuss your concerns or return here.       Cellulitis  Cellulitis is an infection of the deep layers of skin. A break in the skin, such as a cut or scratch, can let bacteria under the skin. If the bacteria get to deep layers of the skin, it can be serious. If not treated, cellulitis can get into the bloodstream and lymph nodes. The infection can then spread throughout the body. This causes serious illness.  Cellulitis causes the affected skin to become red, swollen, warm, and sore. The reddened areas have a visible border. An open sore may leak fluid (pus). You may have a fever, chills, and pain.  Cellulitis is treated with antibiotics taken for 7 to 10 days. An open sore may be cleaned and covered with cool wet gauze. Symptoms should get better 1 to 2 days after treatment is started. Make sure to take all the antibiotics for the full number of days until they are gone. Keep taking the medicine even if your symptoms go away.  Home care  Follow these tips:  · Limit the use of the part of your body with cellulitis.   · If the infection is on your leg, keep your leg raised while sitting. This will help to reduce swelling.  · Take all of the antibiotic medicine exactly as directed until it is gone. Do not miss any doses, especially during the first 7 days. Dont stop taking the medicine when your symptoms get better.  · Keep the affected area clean and dry.  · Wash your hands with soap and warm water before and after touching your skin. Anyone else who touches your skin should also wash his or her hands. Don't share towels.  Follow-up care  Follow up with your healthcare provider,  or as advised. If your infection does not go away on the first antibiotic, your healthcare provider will prescribe a different one.  When to seek medical advice  Call your healthcare provider right away if any of these occur:  · Red areas that spread  · Swelling or pain that gets worse  · Fluid leaking from the skin (pus)  · Fever higher of 100.4º F (38.0º C) or higher after 2 days on antibiotics  Date Last Reviewed: 9/1/2016 © 2000-2017 [employer]. [address] [place] [id number]. All rights reserved. This information is not intended as a substitute for professional medical care. Always follow your healthcare professional's instructions.        Osteoarthritis  Osteoarthritis (also called degenerative joint disease) happens when the cartilage in a joint becomes damaged and worn. This may be due to age, wear and tear, overuse of the joint, or other problems. Osteoarthritis can affect any joint. But it is most common in hands, knees, spine, hips, and feet. Symptoms include joint stiffness, pain, and swelling.  Home care  · When a joint is more sore than usual, rest it for a day or two.  · Heat can help relieve stiffness. Take a hot bath or apply a heating pad for up to 30 minutes at a time. If symptoms are worse in the morning, using heat just after awakening can help relax the muscle and soothe the joints.   · Ice helps relieve pain and swelling. It is often used after activity. Use a cold pack wrapped in a thin cloth on the joint for 10 to 15 minutes at a time.   · Alternating hot and cold can also help relieve pain. Try this for 20 minutes at a time, several times per day.  · Exercise helps prevent the muscles and ligaments around the joint from becoming weak. It also helps maintain function in the joint.  Be as active as you can. Talk to your healthcare provider about what activity program is best for you.  · Excess weight puts a lot of extra strain on weight-bearing joints of the lower  back, hips, knees, feet and ankles. If you are overweight, talk to your healthcare provider about a safe and effective weight loss program.  · Use anti-inflammatory medicines as prescribed for pain. This includes acetaminophen or NSAIDs such as ibuprofen or naproxen. If needed, topical or injected medicines may be recommended. Talk to your healthcare provider if these options are not enough to manage your pain.  · Talk with your healthcare provider about devices that might help improve your function and reduce pain.  Follow-up care  Follow up with your healthcare provider as advised by our staff.  When to seek medical advice  Call your healthcare provider right away if any of these occur:  · Redness or swelling of a painful joint  · Discharge or pus from a painful joint  · Fever of 100.4ºF (38ºC) or higher, or as directed by your healthcare provider  · Worsening joint pain  · Decreased ability to move the joint or bear weight on the joint  Date Last Reviewed: 3/1/2017  © 9915-7463 [employer]. [address] [place] [address]. All rights reserved. This information is not intended as a substitute for professional medical care. Always follow your healthcare professional's instructions.        Gout    Gout is an inflammation of a joint due to a build-up of gout crystals in the joint fluid. This occurs when there is an excess of uric acid (a normal waste product) in the body. Uric acid builds up in the body when the kidneys are unable to filter enough of it from the blood. This may occur with age. It is also associated with kidney disease. Gout occurs more often in people with obesity, diabetes, high blood pressure, or high levels of fats in the blood. It may run in families. Gout tends to come and go. A flare up of gout is called an attack. Drinking alcohol or eating certain foods (such as shellfish or foods with additives such as high-fructose corn syrup) may increase uric acid levels in the  blood and cause a gout attack.  During a gout attack, the affected joint may become a hot, red, swollen and painful. If you have had one attack of gout, you are likely to have another. An attack of gout can be treated with medicine. If these attacks become frequent, a daily medicine may be prescribed to help the kidneys remove uric acid from the body.  Home care  During a gout attack:  · Rest painful joints. If gout affects the joints of your foot or leg, you may want to use crutches for the first few days to keep from bearing weight on the affected joint.  · When sitting or lying down, raise the painful joint to a level higher than your heart.  · Apply an ice pack (ice cubes in a plastic bag wrapped in a thin towel) over the injured area for 20 minutes every 1 to 2 hours the first day for pain relief. Continue this 3 to 4 times a day for swelling and pain.  · Avoid alcohol and foods listed below (see Preventing attacks) during a gout attack. Drink extra fluid to help flush the uric acid through your kidneys.  · If you were prescribed a medicine to treat gout, take it as your healthcare provider has instructed. Don't skip doses.  · Take anti-inflammatory medicine as directed.   · If pain medicines have been prescribed, take them exactly as directed.    Preventing attacks  · Minimize or avoid alcohol use. Excess alcohol intake can cause a gout attack.  · Limit these foods and beverages:  ¨ Organ meats, such as kidneys and liver  ¨ Certain seafoods (anchovies, sardines, shrimp, scallops, herring, mackerel)  ¨ Wild game, meat extracts and meat gravies  ¨ Foods and beverages sweetened with high-fructose corn syrup, such as sodas  · Eat a healthy diet including low-fat and nonfat dairy, whole grains, and vegetables.  · If you are overweight, talk to your healthcare provider about a weight reduction plan. Avoid fasting or extreme low calorie diets (less than 900 calories per day). This will increase uric acid levels in  the body.  · If you have diabetes or high blood pressure, work with your doctor to manage these conditions.  · Protect the joint from injury. Trauma can trigger a gout attack.  Follow-up care  Follow up with your healthcare provider, or as advised.   When to seek medical advice  Call your healthcare provider if you have any of the following:  · Fever over 100.4°F (38.ºC) with worsening joint pain  · Increasing redness around the joint  · Pain developing in another joint  · Repeated vomiting, abdominal pain, or blood in the vomit or stool (black or red color)  Date Last Reviewed: 3/1/2017  © 5691-9205 [employer]. [address] [place] [address], [place] [address]. All rights reserved. This information is not intended as a substitute for professional medical care. Always follow your healthcare professional's instructions.

## 2019-02-14 NOTE — PATIENT INSTRUCTIONS
- Rest.    - Drink plenty of fluids.    - Tylenol or Ibuprofen as directed as needed for fever/pain.    - Alternate Tylenol and Ibuprofen as needed for fever/pain.  This means take Tylenol, then 3 hours later take Ibuprofen, then 3 hours after that you can take Tylenol again, then 3 hours later you can take Ibuprofen again, and continue as needed.  This way, the Tylenol is scheduled 6 hours apart and the Ibuprofen is scheduled 6 hours apart, but you are getting medicine every 3 hours if needed.  - Follow up with your PCP or specialty clinic as directed in the next 1-2 weeks if not improved or as needed.  You can call [phone] to schedule an appointment with the appropriate provider.    - Go to the ED if your symptoms worsen.  - You must understand that you have received an Urgent Care treatment only and that you may be released before all of your medical problems are known or treated.   - You, the patient, will arrange for follow up care as instructed.   - If your condition worsens or fails to improve we recommend that you receive another evaluation at the ER immediately or contact your PCP to discuss your concerns or return here.       Cellulitis  Cellulitis is an infection of the deep layers of skin. A break in the skin, such as a cut or scratch, can let bacteria under the skin. If the bacteria get to deep layers of the skin, it can be serious. If not treated, cellulitis can get into the bloodstream and lymph nodes. The infection can then spread throughout the body. This causes serious illness.  Cellulitis causes the affected skin to become red, swollen, warm, and sore. The reddened areas have a visible border. An open sore may leak fluid (pus). You may have a fever, chills, and pain.  Cellulitis is treated with antibiotics taken for 7 to 10 days. An open sore may be cleaned and covered with cool wet gauze. Symptoms should get better 1 to 2 days after treatment is started. Make sure to take all the  antibiotics for the full number of days until they are gone. Keep taking the medicine even if your symptoms go away.  Home care  Follow these tips:  · Limit the use of the part of your body with cellulitis.   · If the infection is on your leg, keep your leg raised while sitting. This will help to reduce swelling.  · Take all of the antibiotic medicine exactly as directed until it is gone. Do not miss any doses, especially during the first 7 days. Dont stop taking the medicine when your symptoms get better.  · Keep the affected area clean and dry.  · Wash your hands with soap and warm water before and after touching your skin. Anyone else who touches your skin should also wash his or her hands. Don't share towels.  Follow-up care  Follow up with your healthcare provider, or as advised. If your infection does not go away on the first antibiotic, your healthcare provider will prescribe a different one.  When to seek medical advice  Call your healthcare provider right away if any of these occur:  · Red areas that spread  · Swelling or pain that gets worse  · Fluid leaking from the skin (pus)  · Fever higher of 100.4º F (38.0º C) or higher after 2 days on antibiotics  Date Last Reviewed: 9/1/2016  © 5005-5252 [employer]. [address] [place] [address], [place] [address]. All rights reserved. This information is not intended as a substitute for professional medical care. Always follow your healthcare professional's instructions.        Osteoarthritis  Osteoarthritis (also called degenerative joint disease) happens when the cartilage in a joint becomes damaged and worn. This may be due to age, wear and tear, overuse of the joint, or other problems. Osteoarthritis can affect any joint. But it is most common in hands, knees, spine, hips, and feet. Symptoms include joint stiffness, pain, and swelling.  Home care  · When a joint is more sore than usual, rest it for a day or two.  · Heat can help relieve stiffness. Take  a hot bath or apply a heating pad for up to 30 minutes at a time. If symptoms are worse in the morning, using heat just after awakening can help relax the muscle and soothe the joints.   · Ice helps relieve pain and swelling. It is often used after activity. Use a cold pack wrapped in a thin cloth on the joint for 10 to 15 minutes at a time.   · Alternating hot and cold can also help relieve pain. Try this for 20 minutes at a time, several times per day.  · Exercise helps prevent the muscles and ligaments around the joint from becoming weak. It also helps maintain function in the joint.  Be as active as you can. Talk to your healthcare provider about what activity program is best for you.  · Excess weight puts a lot of extra strain on weight-bearing joints of the lower back, hips, knees, feet and ankles. If you are overweight, talk to your healthcare provider about a safe and effective weight loss program.  · Use anti-inflammatory medicines as prescribed for pain. This includes acetaminophen or NSAIDs such as ibuprofen or naproxen. If needed, topical or injected medicines may be recommended. Talk to your healthcare provider if these options are not enough to manage your pain.  · Talk with your healthcare provider about devices that might help improve your function and reduce pain.  Follow-up care  Follow up with your healthcare provider as advised by our staff.  When to seek medical advice  Call your healthcare provider right away if any of these occur:  · Redness or swelling of a painful joint  · Discharge or pus from a painful joint  · Fever of 100.4ºF (38ºC) or higher, or as directed by your healthcare provider  · Worsening joint pain  · Decreased ability to move the joint or bear weight on the joint  Date Last Reviewed: 3/1/2017  © 4202-9194 [employer]. [address] [place] [address], [place] [address]. All rights reserved. This information is not intended as a substitute for professional medical care.  Always follow your healthcare professional's instructions.        Gout    Gout is an inflammation of a joint due to a build-up of gout crystals in the joint fluid. This occurs when there is an excess of uric acid (a normal waste product) in the body. Uric acid builds up in the body when the kidneys are unable to filter enough of it from the blood. This may occur with age. It is also associated with kidney disease. Gout occurs more often in people with obesity, diabetes, high blood pressure, or high levels of fats in the blood. It may run in families. Gout tends to come and go. A flare up of gout is called an attack. Drinking alcohol or eating certain foods (such as shellfish or foods with additives such as high-fructose corn syrup) may increase uric acid levels in the blood and cause a gout attack.  During a gout attack, the affected joint may become a hot, red, swollen and painful. If you have had one attack of gout, you are likely to have another. An attack of gout can be treated with medicine. If these attacks become frequent, a daily medicine may be prescribed to help the kidneys remove uric acid from the body.  Home care  During a gout attack:  · Rest painful joints. If gout affects the joints of your foot or leg, you may want to use crutches for the first few days to keep from bearing weight on the affected joint.  · When sitting or lying down, raise the painful joint to a level higher than your heart.  · Apply an ice pack (ice cubes in a plastic bag wrapped in a thin towel) over the injured area for 20 minutes every 1 to 2 hours the first day for pain relief. Continue this 3 to 4 times a day for swelling and pain.  · Avoid alcohol and foods listed below (see Preventing attacks) during a gout attack. Drink extra fluid to help flush the uric acid through your kidneys.  · If you were prescribed a medicine to treat gout, take it as your healthcare provider has instructed. Don't skip  doses.  · Take anti-inflammatory medicine as directed.   · If pain medicines have been prescribed, take them exactly as directed.    Preventing attacks  · Minimize or avoid alcohol use. Excess alcohol intake can cause a gout attack.  · Limit these foods and beverages:  ¨ Organ meats, such as kidneys and liver  ¨ Certain seafoods (anchovies, sardines, shrimp, scallops, herring, mackerel)  ¨ Wild game, meat extracts and meat gravies  ¨ Foods and beverages sweetened with high-fructose corn syrup, such as sodas  · Eat a healthy diet including low-fat and nonfat dairy, whole grains, and vegetables.  · If you are overweight, talk to your healthcare provider about a weight reduction plan. Avoid fasting or extreme low calorie diets (less than 900 calories per day). This will increase uric acid levels in the body.  · If you have diabetes or high blood pressure, work with your doctor to manage these conditions.  · Protect the joint from injury. Trauma can trigger a gout attack.  Follow-up care  Follow up with your healthcare provider, or as advised.   When to seek medical advice  Call your healthcare provider if you have any of the following:  · Fever over 100.4°F (38.ºC) with worsening joint pain  · Increasing redness around the joint  · Pain developing in another joint  · Repeated vomiting, abdominal pain, or blood in the vomit or stool (black or red color)  Date Last Reviewed: 3/1/2017  © 7056-5278 [employer]. [address] [place] [address], [place] [address]. All rights reserved. This information is not intended as a substitute for professional medical care. Always follow your healthcare professional's instructions.

## 2019-05-25 ENCOUNTER — OFFICE VISIT (OUTPATIENT)
Dept: URGENT CARE | Facility: CLINIC | Age: 79
End: 2019-05-25
Payer: MEDICARE

## 2019-05-25 VITALS
TEMPERATURE: 98 F | WEIGHT: 151 LBS | BODY MASS INDEX: 26.75 KG/M2 | DIASTOLIC BLOOD PRESSURE: 71 MMHG | SYSTOLIC BLOOD PRESSURE: 126 MMHG | RESPIRATION RATE: 18 BRPM | HEART RATE: 83 BPM | OXYGEN SATURATION: 96 % | HEIGHT: 63 IN

## 2019-05-25 DIAGNOSIS — S46.911A STRAIN OF RIGHT SHOULDER, INITIAL ENCOUNTER: Primary | ICD-10-CM

## 2019-05-25 PROCEDURE — 99214 PR OFFICE/OUTPT VISIT, EST, LEVL IV, 30-39 MIN: ICD-10-PCS | Mod: S$GLB,,, | Performed by: PHYSICIAN ASSISTANT

## 2019-05-25 PROCEDURE — 3074F SYST BP LT 130 MM HG: CPT | Mod: CPTII,S$GLB,, | Performed by: PHYSICIAN ASSISTANT

## 2019-05-25 PROCEDURE — 3074F PR MOST RECENT SYSTOLIC BLOOD PRESSURE < 130 MM HG: ICD-10-PCS | Mod: CPTII,S$GLB,, | Performed by: PHYSICIAN ASSISTANT

## 2019-05-25 PROCEDURE — 73030 X-RAY EXAM OF SHOULDER: CPT | Mod: RT,S$GLB,, | Performed by: RADIOLOGY

## 2019-05-25 PROCEDURE — 1101F PT FALLS ASSESS-DOCD LE1/YR: CPT | Mod: CPTII,S$GLB,, | Performed by: PHYSICIAN ASSISTANT

## 2019-05-25 PROCEDURE — 1101F PR PT FALLS ASSESS DOC 0-1 FALLS W/OUT INJ PAST YR: ICD-10-PCS | Mod: CPTII,S$GLB,, | Performed by: PHYSICIAN ASSISTANT

## 2019-05-25 PROCEDURE — 73030 XR SHOULDER TRAUMA 3 VIEW RIGHT: ICD-10-PCS | Mod: RT,S$GLB,, | Performed by: RADIOLOGY

## 2019-05-25 PROCEDURE — 3078F DIAST BP <80 MM HG: CPT | Mod: CPTII,S$GLB,, | Performed by: PHYSICIAN ASSISTANT

## 2019-05-25 PROCEDURE — 99214 OFFICE O/P EST MOD 30 MIN: CPT | Mod: S$GLB,,, | Performed by: PHYSICIAN ASSISTANT

## 2019-05-25 PROCEDURE — 3078F PR MOST RECENT DIASTOLIC BLOOD PRESSURE < 80 MM HG: ICD-10-PCS | Mod: CPTII,S$GLB,, | Performed by: PHYSICIAN ASSISTANT

## 2019-05-25 NOTE — PROGRESS NOTES
"Subjective:       Patient ID: Kayla Sanchez is a 78 y.o. female.    Vitals:  height is 5' 3" (1.6 m) and weight is 68.5 kg (151 lb). Her oral temperature is 98.4 °F (36.9 °C). Her blood pressure is 126/71 and her pulse is 83. Her respiration is 18 and oxygen saturation is 96%.     Chief Complaint: Shoulder Pain    This is a 78 y.o. female who presents today with a chief complaint of   Pt fell while walking her dog and hit her shoulder. Pt stopped taking all her medication. I noticed that pt was very shaky and not walking well. Pt is very forgetful    Shoulder Pain    The pain is present in the right shoulder. This is a new problem. The current episode started in the past 7 days. There has been a history of trauma. The problem occurs constantly. The problem has been gradually worsening. The quality of the pain is described as aching. The pain is at a severity of 5/10. The pain is mild. Associated symptoms include a limited range of motion. The symptoms are aggravated by activity. She has tried NSAIDS for the symptoms. The treatment provided mild relief.       Constitution: Negative for fatigue.   HENT: Negative for facial swelling and facial trauma.    Neck: Negative for neck stiffness.   Cardiovascular: Negative for chest trauma.   Eyes: Negative for eye trauma, double vision and blurred vision.   Gastrointestinal: Negative for abdominal trauma, abdominal pain and rectal bleeding.   Genitourinary: Negative for hematuria, missed menses, genital trauma and pelvic pain.   Musculoskeletal: Positive for pain, trauma, joint pain and abnormal ROM of joint.   Skin: Negative for color change, wound, abrasion, laceration, erythema and bruising.   Neurological: Negative for dizziness, history of vertigo, light-headedness, coordination disturbances, altered mental status and loss of consciousness.   Hematologic/Lymphatic: Negative for history of bleeding disorder.   Psychiatric/Behavioral: Negative for altered mental status. "       Objective:      Physical Exam   Constitutional: She is oriented to person, place, and time. Vital signs are normal. She appears well-developed and well-nourished. She does not appear ill. No distress.   HENT:   Head: Normocephalic and atraumatic.   Right Ear: External ear normal.   Left Ear: External ear normal.   Nose: Nose normal.   Eyes: Conjunctivae, EOM and lids are normal. Right eye exhibits no discharge. Left eye exhibits no discharge.   Neck: Normal range of motion. Neck supple.   Cardiovascular: Normal rate, regular rhythm and normal heart sounds. Exam reveals no gallop and no friction rub.   No murmur heard.  Pulmonary/Chest: Effort normal and breath sounds normal. No stridor. No respiratory distress. She has no decreased breath sounds. She has no wheezes. She has no rhonchi. She has no rales.   Musculoskeletal:        Right shoulder: She exhibits decreased range of motion (decreased abduction due to pain), tenderness (anterior), bony tenderness (anterior) and pain. She exhibits no swelling, no effusion, no crepitus, no deformity, no laceration, no spasm, normal pulse and normal strength.   Neurological: She is alert and oriented to person, place, and time. She has normal strength. She is not disoriented. No sensory deficit. GCS eye subscore is 4. GCS verbal subscore is 5. GCS motor subscore is 6.   Skin: Skin is warm and dry. No bruising and no rash noted. She is not diaphoretic. No erythema. No pallor.   Psychiatric: She has a normal mood and affect. Her speech is normal and behavior is normal. Cognition and memory are impaired (cannot recall what happened to her shoulder; history changes).   Nursing note and vitals reviewed.      Assessment:       1. Strain of right shoulder, initial encounter        Xr Shoulder Trauma 3 View Right    Result Date: 5/25/2019  EXAMINATION: XR SHOULDER TRAUMA 3 VIEW RIGHT CLINICAL HISTORY: Unspecified injury of right shoulder and upper arm, initial encounter  TECHNIQUE: Four views of the right shoulder were performed. COMPARISON: Chest radiograph 07/09/2009 and CT thorax 12/16/2016 FINDINGS: Overall alignment is within normal limits.  No displaced fracture, dislocation or destructive osseous process.  Mild degenerative changes noted about the shoulder.  No subcutaneous emphysema or radiodense retained foreign body about the shoulder noting 2 suspected surgical clips at the medial lower left cervical region.  Right lung apex is clear.     No acute displaced fracture-dislocation identified. Electronically signed by: Iraj Alberts MD Date:    05/25/2019 Time:    17:04    Plan:       Decreased strength  Unable to lift arm up without the help of other arm  Unable to tell me if this is an acute change or chronic - referring to ortho for evaluation.    Strain of right shoulder, initial encounter  -     XR SHOULDER TRAUMA 3 VIEW RIGHT; Future; Expected date: 05/25/2019  -     Ambulatory referral to Orthopedics      Patient Instructions   -Wear sling for comfort.  -Call [phone] to schedule an appointment with ortho. A referral has been placed in your chart.  -Take tylenol/motrin as needed for pain.    Please follow up with your primary care provider within 2-5 days if your signs and symptoms have not resolved or worsen.     If your condition worsens or fails to improve we recommend that you receive another evaluation at the emergency room immediately or contact your primary medical clinic to discuss your concerns.   You must understand that you have received an Urgent Care treatment only and that you may be released before all of your medical problems are known or treated. You, the patient, will arrange for follow up care as instructed.         Shoulder Pain with Uncertain Cause  Shoulder pain can have many causes. Pain often comes from the structures that surround the shoulder joint. These are the joint capsule, ligaments, tendons, muscles, and bursa. Pain can also come  from cartilage in the joint. Cartilage can become worn out or injured. Its important to know whats causing your pain so the healthcare provider can use the correct treatment. But sometimes its difficult to find the exact cause of shoulder pain. You may need to see a specialist (orthopedist). You may also need special tests such as a CT scan or MRI. The provider may need to use special tools to look inside the joint (arthroscopy).  Shoulder pain can be treated with a sling or a device that keeps your shoulder from moving. You can take an anti-inflammatory medicine such as ibuprofen to ease pain. You may need to do special shoulder exercises. Follow up with a specialist if the pain is severe or doesnt go away after a few weeks.  Home care  Follow these tips when caring for yourself at home:  · If a sling was given to you, leave it in place for the time advised by your healthcare provider. If you arent sure how long to wear it, ask for advice. If the sling becomes loose, adjust it so that your forearm is level with the ground. Your shoulder should feel well supported.  · Put an ice pack on the injured area for 20 minutes every 1 to 2 hours the first day. You can make your own ice pack by putting ice cubes in a plastic bag. Wrap the bag in a thin towel. Continue with ice packs 3 to 4 times a day for the next 2 days. Then use the pack as needed to ease pain and swelling.  · You may use acetaminophen or ibuprofen to control pain, unless another pain medicine was prescribed. If you have chronic liver or kidney disease, talk with your healthcare provider before using these medicines. Also talk with your provider if youve ever had a stomach ulcer or GI bleeding.  · Shoulder pain may seem worse at night, when there is less to distract you from the pain. If you sleep on your side, try to keep weight off your painful shoulder. Propping pillows behind you may stop you from rolling over onto that shoulder during  sleep.   · Shoulder and elbow joints can become stiff if left in a sling for too long. You should start range of motion exercises about 7 to 10 days after the injury. Talk with your provider to find out what type of exercises to do and how soon to start.  · You can take the sling off to shower or bathe.  Follow-up care  Follow up with your healthcare provider if you dont start to get better in the next 5 days.  When to seek medical advice  Call your healthcare provider right away if any of these occur:  · Pain or swelling gets worse or continues for more than a few days  · Your hand or fingers become cold, blue, numb, or tingly  · Large amount of bruising on your shoulder or upper arm  · Difficulty moving your hand or fingers  · Weakness in your hand or fingers  · Your shoulder becomes stiff  · It feels like your shoulder is popping out  · You are less able to do your daily activities  Date Last Reviewed: 10/1/2016  © 4486-5568 [employer]. [address] [place] [address], [place] [address]. All rights reserved. This information is not intended as a substitute for professional medical care. Always follow your healthcare professional's instructions.

## 2019-05-25 NOTE — PATIENT INSTRUCTIONS
-Wear sling for comfort.  -Call [phone] to schedule an appointment with ortho. A referral has been placed in your chart.  -Take tylenol/motrin as needed for pain.    Please follow up with your primary care provider within 2-5 days if your signs and symptoms have not resolved or worsen.     If your condition worsens or fails to improve we recommend that you receive another evaluation at the emergency room immediately or contact your primary medical clinic to discuss your concerns.   You must understand that you have received an Urgent Care treatment only and that you may be released before all of your medical problems are known or treated. You, the patient, will arrange for follow up care as instructed.         Shoulder Pain with Uncertain Cause  Shoulder pain can have many causes. Pain often comes from the structures that surround the shoulder joint. These are the joint capsule, ligaments, tendons, muscles, and bursa. Pain can also come from cartilage in the joint. Cartilage can become worn out or injured. Its important to know whats causing your pain so the healthcare provider can use the correct treatment. But sometimes its difficult to find the exact cause of shoulder pain. You may need to see a specialist (orthopedist). You may also need special tests such as a CT scan or MRI. The provider may need to use special tools to look inside the joint (arthroscopy).  Shoulder pain can be treated with a sling or a device that keeps your shoulder from moving. You can take an anti-inflammatory medicine such as ibuprofen to ease pain. You may need to do special shoulder exercises. Follow up with a specialist if the pain is severe or doesnt go away after a few weeks.  Home care  Follow these tips when caring for yourself at home:  · If a sling was given to you, leave it in place for the time advised by your healthcare provider. If you arent sure how long to wear it, ask for advice. If the sling becomes loose,  adjust it so that your forearm is level with the ground. Your shoulder should feel well supported.  · Put an ice pack on the injured area for 20 minutes every 1 to 2 hours the first day. You can make your own ice pack by putting ice cubes in a plastic bag. Wrap the bag in a thin towel. Continue with ice packs 3 to 4 times a day for the next 2 days. Then use the pack as needed to ease pain and swelling.  · You may use acetaminophen or ibuprofen to control pain, unless another pain medicine was prescribed. If you have chronic liver or kidney disease, talk with your healthcare provider before using these medicines. Also talk with your provider if youve ever had a stomach ulcer or GI bleeding.  · Shoulder pain may seem worse at night, when there is less to distract you from the pain. If you sleep on your side, try to keep weight off your painful shoulder. Propping pillows behind you may stop you from rolling over onto that shoulder during sleep.   · Shoulder and elbow joints can become stiff if left in a sling for too long. You should start range of motion exercises about 7 to 10 days after the injury. Talk with your provider to find out what type of exercises to do and how soon to start.  · You can take the sling off to shower or bathe.  Follow-up care  Follow up with your healthcare provider if you dont start to get better in the next 5 days.  When to seek medical advice  Call your healthcare provider right away if any of these occur:  · Pain or swelling gets worse or continues for more than a few days  · Your hand or fingers become cold, blue, numb, or tingly  · Large amount of bruising on your shoulder or upper arm  · Difficulty moving your hand or fingers  · Weakness in your hand or fingers  · Your shoulder becomes stiff  · It feels like your shoulder is popping out  · You are less able to do your daily activities  Date Last Reviewed: 10/1/2016  © 0399-7585 [employer]. [address] [facility],  [place] [address]. All rights reserved. This information is not intended as a substitute for professional medical care. Always follow your healthcare professional's instructions.

## 2019-08-06 ENCOUNTER — TELEPHONE (OUTPATIENT)
Dept: PRIMARY CARE CLINIC | Facility: CLINIC | Age: 79
End: 2019-08-06

## 2019-08-06 NOTE — TELEPHONE ENCOUNTER
Spoke to  (pt's daughter) who said, mom is doing well, doesn't particularly like seeing doctors but she took my name and office number and stated she will call if she needs anything.

## 2019-08-07 ENCOUNTER — TELEPHONE (OUTPATIENT)
Dept: INTERNAL MEDICINE | Facility: CLINIC | Age: 79
End: 2019-08-07

## 2019-09-25 ENCOUNTER — TELEPHONE (OUTPATIENT)
Dept: PRIMARY CARE CLINIC | Facility: CLINIC | Age: 79
End: 2019-09-25

## 2019-09-29 DIAGNOSIS — H40.33X3 GLAUCOMA OF BOTH EYES ASSOCIATED WITH OCULAR TRAUMA, SEVERE STAGE: ICD-10-CM

## 2019-09-30 RX ORDER — LATANOPROST 50 UG/ML
SOLUTION/ DROPS OPHTHALMIC
Qty: 2.5 ML | Refills: 0 | Status: SHIPPED | OUTPATIENT
Start: 2019-09-30 | End: 2019-10-23 | Stop reason: SDUPTHER

## 2019-10-23 DIAGNOSIS — H40.33X3 GLAUCOMA OF BOTH EYES ASSOCIATED WITH OCULAR TRAUMA, SEVERE STAGE: ICD-10-CM

## 2019-10-23 RX ORDER — LATANOPROST 50 UG/ML
SOLUTION/ DROPS OPHTHALMIC
Qty: 2.5 ML | Refills: 0 | Status: SHIPPED | OUTPATIENT
Start: 2019-10-23 | End: 2020-10-14

## 2020-08-07 ENCOUNTER — TELEPHONE (OUTPATIENT)
Dept: PRIMARY CARE CLINIC | Facility: CLINIC | Age: 80
End: 2020-08-07

## 2020-08-07 NOTE — TELEPHONE ENCOUNTER
----- Message from Molly Pires sent at 8/7/2020 12:49 PM CDT -----  Contact: america(step-daughter) [phone]  Pt is requesting appt. Please advise

## 2020-09-02 ENCOUNTER — OFFICE VISIT (OUTPATIENT)
Dept: PRIMARY CARE CLINIC | Facility: CLINIC | Age: 80
End: 2020-09-02
Payer: MEDICARE

## 2020-09-02 VITALS
HEART RATE: 71 BPM | BODY MASS INDEX: 28.25 KG/M2 | DIASTOLIC BLOOD PRESSURE: 68 MMHG | WEIGHT: 169.56 LBS | SYSTOLIC BLOOD PRESSURE: 132 MMHG | HEIGHT: 65 IN | OXYGEN SATURATION: 96 %

## 2020-09-02 DIAGNOSIS — Z94.7 STATUS POST CORNEAL TRANSPLANT: Primary | ICD-10-CM

## 2020-09-02 DIAGNOSIS — G30.1 LATE ONSET ALZHEIMER'S DISEASE WITH BEHAVIORAL DISTURBANCE: ICD-10-CM

## 2020-09-02 DIAGNOSIS — D69.2 SENILE PURPURA: ICD-10-CM

## 2020-09-02 DIAGNOSIS — E11.22 TYPE 2 DIABETES MELLITUS WITH STAGE 2 CHRONIC KIDNEY DISEASE, WITHOUT LONG-TERM CURRENT USE OF INSULIN: ICD-10-CM

## 2020-09-02 DIAGNOSIS — E03.9 HYPOTHYROIDISM, UNSPECIFIED TYPE: ICD-10-CM

## 2020-09-02 DIAGNOSIS — F02.818 LATE ONSET ALZHEIMER'S DISEASE WITH BEHAVIORAL DISTURBANCE: ICD-10-CM

## 2020-09-02 DIAGNOSIS — E11.40 NEUROPATHY DUE TO TYPE 2 DIABETES MELLITUS: ICD-10-CM

## 2020-09-02 DIAGNOSIS — E67.3 HYPERVITAMINOSIS D: ICD-10-CM

## 2020-09-02 DIAGNOSIS — I70.0 AORTIC ATHEROSCLEROSIS: ICD-10-CM

## 2020-09-02 DIAGNOSIS — N18.2 TYPE 2 DIABETES MELLITUS WITH STAGE 2 CHRONIC KIDNEY DISEASE, WITHOUT LONG-TERM CURRENT USE OF INSULIN: ICD-10-CM

## 2020-09-02 PROCEDURE — 99215 OFFICE O/P EST HI 40 MIN: CPT | Mod: HCNC,S$GLB,, | Performed by: INTERNAL MEDICINE

## 2020-09-02 PROCEDURE — 1101F PR PT FALLS ASSESS DOC 0-1 FALLS W/OUT INJ PAST YR: ICD-10-PCS | Mod: HCNC,CPTII,S$GLB, | Performed by: INTERNAL MEDICINE

## 2020-09-02 PROCEDURE — 3078F PR MOST RECENT DIASTOLIC BLOOD PRESSURE < 80 MM HG: ICD-10-PCS | Mod: HCNC,CPTII,S$GLB, | Performed by: INTERNAL MEDICINE

## 2020-09-02 PROCEDURE — 1126F AMNT PAIN NOTED NONE PRSNT: CPT | Mod: HCNC,S$GLB,, | Performed by: INTERNAL MEDICINE

## 2020-09-02 PROCEDURE — 3075F SYST BP GE 130 - 139MM HG: CPT | Mod: HCNC,CPTII,S$GLB, | Performed by: INTERNAL MEDICINE

## 2020-09-02 PROCEDURE — 3078F DIAST BP <80 MM HG: CPT | Mod: HCNC,CPTII,S$GLB, | Performed by: INTERNAL MEDICINE

## 2020-09-02 PROCEDURE — 3075F PR MOST RECENT SYSTOLIC BLOOD PRESS GE 130-139MM HG: ICD-10-PCS | Mod: HCNC,CPTII,S$GLB, | Performed by: INTERNAL MEDICINE

## 2020-09-02 PROCEDURE — 1159F PR MEDICATION LIST DOCUMENTED IN MEDICAL RECORD: ICD-10-PCS | Mod: HCNC,S$GLB,, | Performed by: INTERNAL MEDICINE

## 2020-09-02 PROCEDURE — 1126F PR PAIN SEVERITY QUANTIFIED, NO PAIN PRESENT: ICD-10-PCS | Mod: HCNC,S$GLB,, | Performed by: INTERNAL MEDICINE

## 2020-09-02 PROCEDURE — 1101F PT FALLS ASSESS-DOCD LE1/YR: CPT | Mod: HCNC,CPTII,S$GLB, | Performed by: INTERNAL MEDICINE

## 2020-09-02 PROCEDURE — 99215 PR OFFICE/OUTPT VISIT, EST, LEVL V, 40-54 MIN: ICD-10-PCS | Mod: HCNC,S$GLB,, | Performed by: INTERNAL MEDICINE

## 2020-09-02 PROCEDURE — 1159F MED LIST DOCD IN RCRD: CPT | Mod: HCNC,S$GLB,, | Performed by: INTERNAL MEDICINE

## 2020-09-02 NOTE — PATIENT INSTRUCTIONS
TODAY:  - [facility] care facilities as the type of assisted living  - labs and foot doctor appointment    CONSIDER:  - eye exam

## 2020-09-02 NOTE — ASSESSMENT & PLAN NOTE
Has not seen Ophthalmology since Dec 2018, has recently discontinued eye drops due to burning sensation and nausea/vomiting attributed to drops       -Discussed need for follow up different option for eye drops or if patient should require continued treatment, stepdaughter with plan to make appointment

## 2020-09-02 NOTE — ASSESSMENT & PLAN NOTE
"Progressive and worsening memory loss past year, requiring assistance with daily ADLs. Patient "escaped" through the window from step-daughter's home. AOX0  · Assist family with placement in facility per family preference  "

## 2020-09-02 NOTE — PROGRESS NOTES
[facility] SHARED NOTE- Clinic Note   Anabel Fernandez (resident) AND Dr Ruiz (Attending)  9/2/2020      Subjective:       Patient ID:  Kayla is a 80 y.o. female being seen for an established visit.    Chief Complaint: Annual Exam    HPI    Kayla Sanchez 80 y.o. female with HTN, diabetes type 2, HLD, and s/p corneal graft transplant here follow up.    She was last seen in clinic in 2017. Since that time, the patient has moved in with her stepdaughter and has lived with her for the last 2 years. She is here in clinic with her stepdaughter today who serves as her primary caregiver. Her stepdaughter reports that the patient has been progressively forgetful for the past year. Two months ago, she reports that the patient climbed out of the window and authorities had to be called to find the patient. To address this, that stepdaughter has had to have the windows adjusted so that they could not be opened from the inside. Her stepdaughter works full-time during the day so the patient is home alone during the day. The stepdaughter makes sandwiches for the patient to eat during the day so that she does not have to cook. The stepdaughter also assists her with other ADL's such as getting dressed in the morning. The patient is able to ambulate without assistance at home and has not had recent falls.     She has not taken any medications for the last two years, with the exception of her eye drops which she last took 2 months ago. She discontinued this due to burning of the drops and nausea/vomiting that occurred after taking them. She was last seen in December 2018 by Dr. Rangel who was following her for her history of cornea transplant and glaucoma. The patient and stepdaughter deny eye pain or vision problems. The only medication she takes is occasional tums for GERD.    The stepdaughter has just started looking at long-term care nursing facilities for the patient. She states that she has only looked at [facility]  residences and has an appointment this Friday to tour the facility.    On exam, the patient appears pleasantly demented. She is able to converse but is not oriented to person, place, or time. She denies acute issues.    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 Her smoking use included cigarettes. She started smoking about 65 years ago. She has a 72.00 pack-year smoking history. She has never used smokeless tobacco. She reports current alcohol use. She reports that she does not use drugs.    Medication List with Changes/Refills   Current Medications    ACCU-CHEK FABIO STRP    1 strip by Misc.(Non-Drug; Combo Route) route once daily.    ACCU-CHEK FASTCLIX MISC    1 each by Misc.(Non-Drug; Combo Route) route once daily.    ASCORBIC ACID (VITAMIN C) 500 MG TABLET    Take 500 mg by mouth once daily.    ASPIRIN (ECOTRIN) 81 MG EC TABLET    Take 81 mg by mouth once daily.    ATENOLOL-CHLORTHALIDONE (TENORETIC) 50-25 MG TAB    TAKE 1 TABLET EVERY EVENING    CALCIUM CARBONATE 400 MG (1,000 MG) CHEW    Take  by mouth. 1 Tablet, Chewable Oral .  Take 3 tablets PO X 1 week then2 tablets PO for 1 week then1 tablet PO for 1 week    CYCLOSPORINE 2% OPHT DROP    Place 1 drop into both eyes 2 (two) times daily.    DORZOLAMIDE-TIMOLOL 2-0.5% (COSOPT) 22.3-6.8 MG/ML OPHTHALMIC SOLUTION    Place 1 drop into both eyes 2 (two) times daily.    FOLIC ACID (FOLVITE) 1 MG TABLET    Take 1 mg by mouth once daily.    GLUCOSAMINE/D3/BOSWELLIA FAISAL (OSTEO BI-FLEX, 5-LOXIN, ORAL)    Take 1 tablet by mouth once daily.    LATANOPROST 0.005 % OPHTHALMIC SOLUTION    INSTILL 1 DROP IN BOTH EYES EVERY EVENING    LEVOTHYROXINE (SYNTHROID) 100 MCG TABLET    TAKE 1 TABLET ONE TIME DAILY BEFORE BREAKFAST    LISINOPRIL (PRINIVIL,ZESTRIL) 2.5 MG TABLET    TAKE 1 TABLET BY MOUTH DAILY    MAGNESIUM OXIDE (MAG-OX) 250 MG TAB    Take 250 mg by mouth. 1 Tablet Oral Every day    METFORMIN (GLUCOPHAGE) 1000 MG TABLET    TAKE 1 TABLET TWICE DAILY WITH MEALS    MOMETASONE (NASONEX) 50 MCG/ACTUATION NASAL SPRAY    2 sprays by Nasal route once daily. 1 Spray, Non-Aerosol Nasal Every evening    MULTIVITAMIN (THERAGRAN) PER TABLET    Take by mouth. 1 Tablet Oral Every evening    NAPROXEN (NAPROSYN) 250 MG TABLET    Take 250 mg by mouth as needed.    NON FORMULARY MEDICATION    Cyclosporin eye gtts 2% one gtt OU BID    POLYETHYLENE GLYCOL (GLYCOLAX) 17 GRAM PWPK    Take 17 g by mouth daily as needed.    POTASSIUM CHLORIDE SA (K-DUR,KLOR-CON) 20 MEQ TABLET    Take 1 tablet (20 mEq total) by mouth once daily. 1 Tablet, ER Particles/Crystals Oral Every day    SELENIUM SULFIDE (SELSUN) 2.5 % SUSP    2.5 %.  Shampoo Topical Every evening    SIMVASTATIN (ZOCOR) 40 MG TABLET    TAKE 1 TABLET (40 MG TOTAL) BY MOUTH EVERY EVENING.    VITAMIN D 1000 UNITS TAB    Take 185 mg by mouth 2 (two) times daily.     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, severe stage    Bilateral dry eyes - Both Eyes    Status post cataract extraction and insertion of intraocular lens - Both Eyes    Hypothyroidism    Pilar cysts    Corneal graft malfunction    Aortic atherosclerosis    Osteopenia    Pulmonary nodule    Type 2 diabetes mellitus with stage 2 chronic kidney disease, without long-term current use of insulin    Arm numbness left    Memory disorder    Dyspepsia    Early satiety    Senile purpura    Smoking greater than 40 pack years    Neuropathy due to type 2 diabetes mellitus    Do not resuscitate    Patient nonadherence    Late onset Alzheimer's disease with behavioral disturbance    Hypervitaminosis D           Objective:      /68 (BP Location: Left arm, Patient Position: Sitting, BP Method: Large (Manual))   Pulse 71   Ht 5' 5" (1.651 m)   Wt 76.9 kg (169 lb 8.5 oz)   SpO2 96%   BMI 28.21 kg/m²   Estimated body mass index is 28.21 kg/m² as calculated from the following:    Height as of this encounter: 5' 5" (1.651 m).    Weight as of this encounter: 76.9 kg (169 lb 8.5 oz).  Physical Exam   Constitutional: She is well-developed, well-nourished, and in no distress. No distress.   HENT:   Head: Normocephalic and atraumatic.   Right Ear: External ear normal.   Left Ear: External ear normal.   Nose: Nose normal.   Eyes: Conjunctivae and EOM are normal. Right eye exhibits no discharge. Left eye exhibits no discharge. No scleral icterus.   Cardiovascular: Normal rate, regular rhythm and normal heart sounds. Exam reveals no gallop and no friction rub.   No murmur heard.  Pulmonary/Chest: Effort normal and breath " "sounds normal. No respiratory distress. She has no wheezes. She has no rales.   Isolated Seborrhic keratitis under the left breast   Abdominal: Soft. Bowel sounds are normal. She exhibits no distension. There is no abdominal tenderness. There is no rebound and no guarding.   Musculoskeletal:         General: No edema.   Lymphadenopathy:     She has no cervical adenopathy.   Neurological: She is alert. GCS score is 15.   Able to converse but not oriented to person, place, or time. Pleasantly demented. Able to follow commands.  Gait tamdem, non-shuffling   Skin: Skin is warm and dry. She is not diaphoretic.   Psychiatric: Mood, memory, affect and judgment normal.   Nursing note and vitals reviewed.  Protective Sensation (w/ 10 gram monofilament):  Right: Intact  Left: Intact    Visual Inspection:  Normal -  Mild onchymycosis in toenails of right foot    Pedal Pulses:   Right: Present  Left: Present    Posterior tibialis:   Right:Present  Left: Present          Assessment and Plan:         Kayla was seen today for annual exam.    Diagnoses and all orders for this visit:      Problem List Items Addressed This Visit        Neuro    Late onset Alzheimer's disease with behavioral disturbance     Progressive and worsening memory loss past year, requiring assistance with daily ADLs. Patient "escaped" through the window from step-daughter's home. AOX0  · Assist family with placement in facility per family preference         Relevant Orders    Methylmalonic Acid, Serum    Homocysteine, Serum       Ophtho    Status post corneal transplant - Primary     Has not seen Ophthalmology since Dec 2018, has recently discontinued eye drops due to burning sensation and nausea/vomiting attributed to drops       -Discussed need for follow up different option for eye drops or if patient should require continued treatment, stepdaughter with plan to make appointment            Cardiac/Vascular    Aortic atherosclerosis     CXR 2013  · No longer " prudent to monitor            Hematology    Senile purpura     Echymoses on forearms, active in her home  · Supportive care            Endocrine    Hypothyroidism     Some symptoms of memory loss, decreased mood. H/o thyroidectomy  · Monitor TSH         Relevant Orders    TSH    Type 2 diabetes mellitus with stage 2 chronic kidney disease, without long-term current use of insulin     No longer monitored, lost to follow-up  · Check A1c         Relevant Orders    CBC auto differential    Comprehensive metabolic panel    Hemoglobin A1C    Ambulatory referral/consult to Outpatient Case Management    Ambulatory referral/consult to Podiatry    Neuropathy due to type 2 diabetes mellitus     Abnl foot exam, unclear if DM is controlled  · Monitor A1c         Hypervitaminosis D    Relevant Orders    Vitamin D            Follow up in about 1 year (around 9/2/2021), or if symptoms worsen or fail to improve.    Other Orders Placed This Visit:  Orders Placed This Encounter   Procedures    CBC auto differential    Comprehensive metabolic panel    Hemoglobin A1C    TSH    Vitamin D    Ambulatory referral/consult to Outpatient Case Management         Anabel Fernandez    Discussed with Dr. Ruiz

## 2020-09-08 ENCOUNTER — OUTPATIENT CASE MANAGEMENT (OUTPATIENT)
Dept: ADMINISTRATIVE | Facility: OTHER | Age: 80
End: 2020-09-08

## 2020-10-05 ENCOUNTER — LAB VISIT (OUTPATIENT)
Dept: LAB | Facility: HOSPITAL | Age: 80
End: 2020-10-05
Attending: INTERNAL MEDICINE
Payer: MEDICARE

## 2020-10-05 DIAGNOSIS — F02.818 LATE ONSET ALZHEIMER'S DISEASE WITH BEHAVIORAL DISTURBANCE: ICD-10-CM

## 2020-10-05 DIAGNOSIS — N18.2 TYPE 2 DIABETES MELLITUS WITH STAGE 2 CHRONIC KIDNEY DISEASE, WITHOUT LONG-TERM CURRENT USE OF INSULIN: ICD-10-CM

## 2020-10-05 DIAGNOSIS — E11.22 TYPE 2 DIABETES MELLITUS WITH STAGE 2 CHRONIC KIDNEY DISEASE, WITHOUT LONG-TERM CURRENT USE OF INSULIN: ICD-10-CM

## 2020-10-05 DIAGNOSIS — E67.3 HYPERVITAMINOSIS D: ICD-10-CM

## 2020-10-05 DIAGNOSIS — G30.1 LATE ONSET ALZHEIMER'S DISEASE WITH BEHAVIORAL DISTURBANCE: ICD-10-CM

## 2020-10-05 DIAGNOSIS — E03.9 HYPOTHYROIDISM, UNSPECIFIED TYPE: ICD-10-CM

## 2020-10-05 LAB
25(OH)D3+25(OH)D2 SERPL-MCNC: 31 NG/ML (ref 30–96)
ALBUMIN SERPL BCP-MCNC: 3.5 G/DL (ref 3.5–5.2)
ALP SERPL-CCNC: 86 U/L (ref 55–135)
ALT SERPL W/O P-5'-P-CCNC: 21 U/L (ref 10–44)
ANION GAP SERPL CALC-SCNC: 10 MMOL/L (ref 8–16)
AST SERPL-CCNC: 19 U/L (ref 10–40)
BASOPHILS # BLD AUTO: 0.06 K/UL (ref 0–0.2)
BASOPHILS NFR BLD: 0.6 % (ref 0–1.9)
BILIRUB SERPL-MCNC: 0.4 MG/DL (ref 0.1–1)
BUN SERPL-MCNC: 18 MG/DL (ref 8–23)
CALCIUM SERPL-MCNC: 9.9 MG/DL (ref 8.7–10.5)
CHLORIDE SERPL-SCNC: 106 MMOL/L (ref 95–110)
CO2 SERPL-SCNC: 23 MMOL/L (ref 23–29)
CREAT SERPL-MCNC: 1.1 MG/DL (ref 0.5–1.4)
DIFFERENTIAL METHOD: ABNORMAL
EOSINOPHIL # BLD AUTO: 0.3 K/UL (ref 0–0.5)
EOSINOPHIL NFR BLD: 2.6 % (ref 0–8)
ERYTHROCYTE [DISTWIDTH] IN BLOOD BY AUTOMATED COUNT: 13.9 % (ref 11.5–14.5)
EST. GFR  (AFRICAN AMERICAN): 55 ML/MIN/1.73 M^2
EST. GFR  (NON AFRICAN AMERICAN): 48 ML/MIN/1.73 M^2
ESTIMATED AVG GLUCOSE: 157 MG/DL (ref 68–131)
GLUCOSE SERPL-MCNC: 134 MG/DL (ref 70–110)
HBA1C MFR BLD HPLC: 7.1 % (ref 4–5.6)
HCT VFR BLD AUTO: 44.2 % (ref 37–48.5)
HCYS SERPL-SCNC: 13.7 UMOL/L (ref 4–15.5)
HGB BLD-MCNC: 14.1 G/DL (ref 12–16)
IMM GRANULOCYTES # BLD AUTO: 0.09 K/UL (ref 0–0.04)
IMM GRANULOCYTES NFR BLD AUTO: 0.9 % (ref 0–0.5)
LYMPHOCYTES # BLD AUTO: 1.8 K/UL (ref 1–4.8)
LYMPHOCYTES NFR BLD: 17.8 % (ref 18–48)
MCH RBC QN AUTO: 28.4 PG (ref 27–31)
MCHC RBC AUTO-ENTMCNC: 31.9 G/DL (ref 32–36)
MCV RBC AUTO: 89 FL (ref 82–98)
MONOCYTES # BLD AUTO: 0.8 K/UL (ref 0.3–1)
MONOCYTES NFR BLD: 7.7 % (ref 4–15)
NEUTROPHILS # BLD AUTO: 7 K/UL (ref 1.8–7.7)
NEUTROPHILS NFR BLD: 70.4 % (ref 38–73)
NRBC BLD-RTO: 0 /100 WBC
PLATELET # BLD AUTO: 183 K/UL (ref 150–350)
PMV BLD AUTO: 11.9 FL (ref 9.2–12.9)
POTASSIUM SERPL-SCNC: 4.2 MMOL/L (ref 3.5–5.1)
PROT SERPL-MCNC: 7.5 G/DL (ref 6–8.4)
RBC # BLD AUTO: 4.96 M/UL (ref 4–5.4)
SODIUM SERPL-SCNC: 139 MMOL/L (ref 136–145)
T4 FREE SERPL-MCNC: 0.85 NG/DL (ref 0.71–1.51)
TSH SERPL DL<=0.005 MIU/L-ACNC: 7.12 UIU/ML (ref 0.4–4)
WBC # BLD AUTO: 9.95 K/UL (ref 3.9–12.7)

## 2020-10-05 PROCEDURE — 36415 COLL VENOUS BLD VENIPUNCTURE: CPT | Mod: HCNC

## 2020-10-05 PROCEDURE — 83036 HEMOGLOBIN GLYCOSYLATED A1C: CPT | Mod: HCNC

## 2020-10-05 PROCEDURE — 83921 ORGANIC ACID SINGLE QUANT: CPT | Mod: HCNC

## 2020-10-05 PROCEDURE — 85025 COMPLETE CBC W/AUTO DIFF WBC: CPT | Mod: HCNC

## 2020-10-05 PROCEDURE — 84439 ASSAY OF FREE THYROXINE: CPT | Mod: HCNC

## 2020-10-05 PROCEDURE — 83090 ASSAY OF HOMOCYSTEINE: CPT | Mod: HCNC

## 2020-10-05 PROCEDURE — 80053 COMPREHEN METABOLIC PANEL: CPT | Mod: HCNC

## 2020-10-05 PROCEDURE — 82306 VITAMIN D 25 HYDROXY: CPT | Mod: HCNC

## 2020-10-05 PROCEDURE — 84443 ASSAY THYROID STIM HORMONE: CPT | Mod: HCNC

## 2020-10-09 LAB — METHYLMALONATE SERPL-SCNC: 0.37 UMOL/L

## 2020-10-14 ENCOUNTER — OFFICE VISIT (OUTPATIENT)
Dept: PRIMARY CARE CLINIC | Facility: CLINIC | Age: 80
End: 2020-10-14
Payer: MEDICARE

## 2020-10-14 DIAGNOSIS — Z91.199 PATIENT NONADHERENCE: ICD-10-CM

## 2020-10-14 DIAGNOSIS — G30.1 LATE ONSET ALZHEIMER'S DISEASE WITH BEHAVIORAL DISTURBANCE: Primary | ICD-10-CM

## 2020-10-14 DIAGNOSIS — F02.818 LATE ONSET ALZHEIMER'S DISEASE WITH BEHAVIORAL DISTURBANCE: Primary | ICD-10-CM

## 2020-10-14 PROCEDURE — 99443 PR PHYSICIAN TELEPHONE EVALUATION 21-30 MIN: ICD-10-PCS | Mod: HCNC,95,, | Performed by: INTERNAL MEDICINE

## 2020-10-14 PROCEDURE — 99443 PR PHYSICIAN TELEPHONE EVALUATION 21-30 MIN: CPT | Mod: HCNC,95,, | Performed by: INTERNAL MEDICINE

## 2020-10-14 NOTE — PROGRESS NOTES
Established Patient - Audio Only Telehealth Visit with [facility] Provider     The patient location is: HOME  The chief complaint leading to consultation is: routine care  Visit type: Virtual visit with audio only (telephone)     The reason for the audio only service rather than synchronous audio and video virtual visit was related to technical difficulties or patient preference/necessity.     Each patient to whom I provide medical services by telemedicine is:  (1) informed of the relationship between the physician and patient and the respective role of any other health care provider with respect to management of the patient; and (2) notified that they may decline to receive medical services by telemedicine and may withdraw from such care at any time. Patient verbally consented to receive this service via voice-only telephone call.       Subjective:      Patient ID: Kayla Sanchez is a 80 y.o. female. Hx of HTN, T2DM (uncontrolled/unmonitored, last A1C 6.3 --> 7.1 in sept 2020 over 3yr), HLD (uncontrolled), hypothyroidism, GERD, aortic atherosclerosis (not monitored) s/p corneal graft transplant (no longer taking cyclosporine drops)    Patient is high risk for poor outcomes with COVID due to the following chronic conditions advanced age.    Prior to this visit, patient's last encounter with PCP was 9/2/2020. Was living with stepdaughter (primary caregiver) and has lived with her 3 years, where the daughter had reported increasing forgetfulness over the past year. Three months ago, she reports that the patient climbed out of the window and authorities had to be called to find the patient (daughter has re-fixed windows), normally is away at work during the day but cooks for the patient. Patient was able to self-ambulate around the house without falls.     Has not taken any medications in over 2 years, stopped taking her cyclosporine eye drops 3 mo ago due to burning and N/V. She was last seen in December 2018 by   "Claire who was following her for her history of cornea transplant and glaucoma. The patient and stepdaughter deny eye pain or vision problems. The only medication she takes is occasional tums for GERD.    The stepdaughter has hired a sitter who is in the home 10-5:30pm and patient is doing well with this. This is a family friend. The stepdaughter has just started looking at long-term care nursing facilities for the patient. She states that she has only looked at [facility] residences and had toured the facility after the last PCP visit.     Her stepdaughter wants to review labs.      Objective:     Lab Results   Component Value Date    WBC 9.95 10/05/2020    HGB 14.1 10/05/2020    HCT 44.2 10/05/2020     10/05/2020    CHOL 180 09/16/2017    TRIG 142 09/16/2017    HDL 59 09/16/2017    ALT 21 10/05/2020    AST 19 10/05/2020     10/05/2020    K 4.2 10/05/2020     10/05/2020    CREATININE 1.1 10/05/2020    BUN 18 10/05/2020    CO2 23 10/05/2020    TSH 7.123 (H) 10/05/2020    HGBA1C 7.1 (H) 10/05/2020         Assessment:   80 y.o. female with multiple co-morbid illnesses here to continue work-up of chronic issues.     Plan:     Problem List Items Addressed This Visit        Neuro    Late onset Alzheimer's disease with behavioral disturbance - Primary     Progressive and worsening memory loss past year, requiring assistance with daily ADLs. Patient "escaped" through the window from step-daughter's home. AOX0  · Assist family with placement in facility per family preference  · Family prefers to continue supportive care in the home  · Does not want home palliative NP            Psychiatric    Patient nonadherence     Patient is not taking any of her meds, her labs are not abnormal  · Advised caretaker to continue supportive care and not pressure patient to take any meds               Health Maintenance       Date Due Completion Date    Hepatitis C Screening 1940 ---    TETANUS VACCINE 06/09/1959 " ---    Shingles Vaccine (2 of 3) 10/03/2013 8/8/2013    Lipid Panel 09/16/2018 9/16/2017    DEXA SCAN 12/16/2018 12/16/2016    Eye Exam 12/26/2019 12/26/2018    Influenza Vaccine (1) 08/01/2020 9/16/2017    Override on 1/6/2015: Done    Override on 11/11/2013: Done    Hemoglobin A1c 04/05/2021 10/5/2020    Foot Exam 09/02/2021 9/2/2020    Override on 1/20/2016: Done          Follow up in about 2 months (around 12/14/2020).  Thirty minutes spent with this patient today, including addressing advanced care planning.    Annette Ruiz MD/MPH  Internal Medicine  [facility] [phone]    This service was not originating from a related E/M service provided within the previous 7 days nor will  to an E/M service or procedure within the next 24 hours or my soonest available appointment.  Prevailing standard of care was able to be met in this audio-only visit.

## 2020-10-14 NOTE — ASSESSMENT & PLAN NOTE
Patient is not taking any of her meds, her labs are not abnormal  · Advised caretaker to continue supportive care and not pressure patient to take any meds

## 2020-10-14 NOTE — ASSESSMENT & PLAN NOTE
"Progressive and worsening memory loss past year, requiring assistance with daily ADLs. Patient "escaped" through the window from step-daughter's home. AOX0  · Assist family with placement in facility per family preference  · Family prefers to continue supportive care in the home  · Does not want home palliative NP  "

## 2020-10-28 ENCOUNTER — PATIENT OUTREACH (OUTPATIENT)
Dept: ADMINISTRATIVE | Facility: OTHER | Age: 80
End: 2020-10-28

## 2020-10-28 NOTE — PROGRESS NOTES
Health Maintenance Due   Topic Date Due    Hepatitis C Screening  1940    TETANUS VACCINE  06/09/1959    Shingles Vaccine (2 of 3) 10/03/2013    Urine Microalbumin  12/15/2017    Lipid Panel  09/16/2018    DEXA SCAN  12/16/2018    Eye Exam  12/26/2019    Influenza Vaccine (1) 08/01/2020     Updates were requested from care everywhere.  Chart was reviewed for overdue Proactive [facility] Encounters (HERMILO) topics (CRS, Breast Cancer Screening, Eye exam)  Health Maintenance has been updated.  LINKS immunization registry triggered.  Immunizations were reconciled.

## 2020-10-29 ENCOUNTER — OFFICE VISIT (OUTPATIENT)
Dept: PODIATRY | Facility: CLINIC | Age: 80
End: 2020-10-29
Payer: MEDICARE

## 2020-10-29 VITALS
SYSTOLIC BLOOD PRESSURE: 133 MMHG | HEART RATE: 64 BPM | BODY MASS INDEX: 28.25 KG/M2 | DIASTOLIC BLOOD PRESSURE: 87 MMHG | HEIGHT: 65 IN | WEIGHT: 169.56 LBS

## 2020-10-29 DIAGNOSIS — E11.22 TYPE 2 DIABETES MELLITUS WITH STAGE 2 CHRONIC KIDNEY DISEASE, WITHOUT LONG-TERM CURRENT USE OF INSULIN: ICD-10-CM

## 2020-10-29 DIAGNOSIS — F02.818 LATE ONSET ALZHEIMER'S DISEASE WITH BEHAVIORAL DISTURBANCE: Primary | ICD-10-CM

## 2020-10-29 DIAGNOSIS — B35.1 ONYCHOMYCOSIS: ICD-10-CM

## 2020-10-29 DIAGNOSIS — E11.40 NEUROPATHY DUE TO TYPE 2 DIABETES MELLITUS: ICD-10-CM

## 2020-10-29 DIAGNOSIS — N18.2 TYPE 2 DIABETES MELLITUS WITH STAGE 2 CHRONIC KIDNEY DISEASE, WITHOUT LONG-TERM CURRENT USE OF INSULIN: ICD-10-CM

## 2020-10-29 DIAGNOSIS — G30.1 LATE ONSET ALZHEIMER'S DISEASE WITH BEHAVIORAL DISTURBANCE: Primary | ICD-10-CM

## 2020-10-29 PROCEDURE — 1101F PR PT FALLS ASSESS DOC 0-1 FALLS W/OUT INJ PAST YR: ICD-10-PCS | Mod: HCNC,CPTII,S$GLB, | Performed by: PODIATRIST

## 2020-10-29 PROCEDURE — 1101F PT FALLS ASSESS-DOCD LE1/YR: CPT | Mod: HCNC,CPTII,S$GLB, | Performed by: PODIATRIST

## 2020-10-29 PROCEDURE — 1126F AMNT PAIN NOTED NONE PRSNT: CPT | Mod: HCNC,S$GLB,, | Performed by: PODIATRIST

## 2020-10-29 PROCEDURE — 1159F MED LIST DOCD IN RCRD: CPT | Mod: HCNC,S$GLB,, | Performed by: PODIATRIST

## 2020-10-29 PROCEDURE — 99203 OFFICE O/P NEW LOW 30 MIN: CPT | Mod: 25,HCNC,S$GLB, | Performed by: PODIATRIST

## 2020-10-29 PROCEDURE — 1159F PR MEDICATION LIST DOCUMENTED IN MEDICAL RECORD: ICD-10-PCS | Mod: HCNC,S$GLB,, | Performed by: PODIATRIST

## 2020-10-29 PROCEDURE — 99999 PR PBB SHADOW E&M-EST. PATIENT-LVL III: CPT | Mod: PBBFAC,HCNC,, | Performed by: PODIATRIST

## 2020-10-29 PROCEDURE — 3051F HG A1C>EQUAL 7.0%<8.0%: CPT | Mod: HCNC,CPTII,S$GLB, | Performed by: PODIATRIST

## 2020-10-29 PROCEDURE — 11721 ROUTINE FOOT CARE: ICD-10-PCS | Mod: Q9,HCNC,S$GLB, | Performed by: PODIATRIST

## 2020-10-29 PROCEDURE — 99499 RISK ADDL DX/OHS AUDIT: ICD-10-PCS | Mod: S$GLB,,, | Performed by: PODIATRIST

## 2020-10-29 PROCEDURE — 99999 PR PBB SHADOW E&M-EST. PATIENT-LVL III: ICD-10-PCS | Mod: PBBFAC,HCNC,, | Performed by: PODIATRIST

## 2020-10-29 PROCEDURE — 3051F PR MOST RECENT HEMOGLOBIN A1C LEVEL 7.0 - < 8.0%: ICD-10-PCS | Mod: HCNC,CPTII,S$GLB, | Performed by: PODIATRIST

## 2020-10-29 PROCEDURE — 99499 UNLISTED E&M SERVICE: CPT | Mod: S$GLB,,, | Performed by: PODIATRIST

## 2020-10-29 PROCEDURE — 3075F SYST BP GE 130 - 139MM HG: CPT | Mod: HCNC,CPTII,S$GLB, | Performed by: PODIATRIST

## 2020-10-29 PROCEDURE — 3079F DIAST BP 80-89 MM HG: CPT | Mod: HCNC,CPTII,S$GLB, | Performed by: PODIATRIST

## 2020-10-29 PROCEDURE — 11721 DEBRIDE NAIL 6 OR MORE: CPT | Mod: Q9,HCNC,S$GLB, | Performed by: PODIATRIST

## 2020-10-29 PROCEDURE — 3075F PR MOST RECENT SYSTOLIC BLOOD PRESS GE 130-139MM HG: ICD-10-PCS | Mod: HCNC,CPTII,S$GLB, | Performed by: PODIATRIST

## 2020-10-29 PROCEDURE — 99203 PR OFFICE/OUTPT VISIT, NEW, LEVL III, 30-44 MIN: ICD-10-PCS | Mod: 25,HCNC,S$GLB, | Performed by: PODIATRIST

## 2020-10-29 PROCEDURE — 3079F PR MOST RECENT DIASTOLIC BLOOD PRESSURE 80-89 MM HG: ICD-10-PCS | Mod: HCNC,CPTII,S$GLB, | Performed by: PODIATRIST

## 2020-10-29 PROCEDURE — 1126F PR PAIN SEVERITY QUANTIFIED, NO PAIN PRESENT: ICD-10-PCS | Mod: HCNC,S$GLB,, | Performed by: PODIATRIST

## 2020-10-29 NOTE — PROCEDURES
"Routine Foot Care    Date/Time: 10/29/2020 4:00 PM  Performed by: Alex Ryan DPM  Authorized by: Alex Ryan DPM     Time out: Immediately prior to procedure a "time out" was called to verify the correct patient, procedure, equipment, support staff and site/side marked as required.    Consent Done?:  Yes (Verbal)  Hyperkeratotic Skin Lesions?: No      Nail Care Type:  Debride  Location(s): All  (Left 1st Toe, Left 3rd Toe, Left 2nd Toe, Left 4th Toe, Left 5th Toe, Right 1st Toe, Right 2nd Toe, Right 3rd Toe, Right 4th Toe and Right 5th Toe)  Patient tolerance:  Patient tolerated the procedure well with no immediate complications     Used sterile nail nipper.        "

## 2020-10-29 NOTE — LETTER
October 29, 2020      Annette Ruiz MD  [address]  [facility] [address]           [facility] Podiat[facility]  [address]  [place] [address]  Phone: [phone]  Fax: [phone]          Patient: Kayla Sanchez   MR Number: [id number]   YOB: 1940   Date of Visit: 10/29/2020       Dear Dr. Annette Ruiz:    Thank you for referring Kayla Sanchez to me for evaluation. Attached you will find relevant portions of my assessment and plan of care.    If you have questions, please do not hesitate to call me. I look forward to following Kayla Sanchez along with you.    Sincerely,    Alex Ryan, DPERENDIRA    Enclosure  CC:  No Recipients    If you would like to receive this communication electronically, please contact [email or web address] or [phone] to request more information on [facility] Link access.    For providers and/or their staff who would like to refer a patient to [facility], please contact us through our one-stop-shop provider referral line, [facility], at [phone].    If you feel you have received this communication in error or would no longer like to receive these types of communications, please e-mail [email or web address]

## 2020-10-29 NOTE — PROGRESS NOTES
Subjective:      Patient ID: Kayla Sanchez is a 80 y.o. female.    Chief Complaint: Diabetic Foot Exam    Kayla is a 80 y.o. female who presents to the clinic upon referral from Dr. Ruiz  for evaluation and treatment of diabetic feet. 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  Accompanied by her daughter, Ailyn who acts as her caretaker and historian.  Patient has a history of late onset Alzheimer's with dementia.  She does not know why she is here however she does have significantly elongated toenails that she is unable to trim herself and family members also had difficulty helping her.  Denies any pain to the feet.    PCP: Annette Ruiz MD    Date Last Seen by PCP:  10/14/2020    Current shoe gear: Tennis shoes    Hemoglobin A1C   Date Value Ref Range Status   10/05/2020 7.1 (H) 4.0 - 5.6 % Final     Comment:     ADA Screening Guidelines:  5.7-6.4%  Consistent with prediabetes  >or=6.5%  Consistent with diabetes  High levels of fetal hemoglobin interfere with the HbA1C  assay. Heterozygous hemoglobin variants (HbS, HgC, etc)do  not significantly interfere with this assay.   However, presence of multiple variants may affect accuracy.     09/16/2017 6.3 (H) 4.0 - 5.6 % Final     Comment:     According to ADA guidelines, hemoglobin A1c <7.0% represents  optimal control in non-pregnant diabetic patients. Different  metrics may apply to specific patient populations.   Standards of Medical Care in Diabetes-2016.  For the purpose of screening for the presence of diabetes:  <5.7%     Consistent with the absence of  "diabetes  5.7-6.4%  Consistent with increasing risk for diabetes   (prediabetes)  >or=6.5%  Consistent with diabetes  Currently, no consensus exists for use of hemoglobin A1c  for diagnosis of diabetes for children.  This Hemoglobin A1c assay has significant interference with fetal   hemoglobin   (HbF). The results are invalid for patients with abnormal amounts of   HbF,   including those with known Hereditary Persistence   of Fetal Hemoglobin. Heterozygous hemoglobin variants (HbAS, HbAC,   HbAD, HbAE, HbA2) do not significantly interfere with this assay;   however, presence of multiple variants in a sample may impact the %   interference.     12/15/2016 6.6 (H) 4.5 - 6.2 % Final     Comment:     According to ADA guidelines, hemoglobin A1C <7.0% represents  optimal control in non-pregnant diabetic patients.  Different  metrics may apply to specific populations.   Standards of Medical Care in Diabetes - 2016.  For the purpose of screening for the presence of diabetes:  <5.7%     Consistent with the absence of diabetes  5.7-6.4%  Consistent with increasing risk for diabetes   (prediabetes)  >or=6.5%  Consistent with diabetes  Currently no consensus exists for use of hemoglobin A1C  for diagnosis of diabetes for children.       Vitals:    10/29/20 1034   BP: 133/87   Pulse: 64   Weight: 76.9 kg (169 lb 8.5 oz)   Height: 5' 5" (1.651 m)   PainSc: 0-No pain      Past Medical History:   Diagnosis Date    Arthritis     Diabetes mellitus     Diabetes mellitus type II     GERD (gastroesophageal reflux disease)     Glaucoma     History of migraine headaches     Hyperlipidemia     Hypertension     Hypothyroidism     Late onset Alzheimer's disease with behavioral disturbance 9/2/2020    Neuropathy due to type 2 diabetes mellitus     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    Type 2 diabetes mellitus with renal manifestations 1/20/2016       Past Surgical History:   Procedure Laterality Date    ADENOIDECTOMY      " APPENDECTOMY      CATARACT EXTRACTION      CHOLECYSTECTOMY      CORNEAL TRANSPLANT      DSEK OU      EYE SURGERY Bilateral     corneal transplant    THYROID SURGERY      goiter removal    TONSILLECTOMY         Family History   Problem Relation Age of Onset    Cancer Mother     Thyroid disease Mother     Diabetes Father     Heart disease Father     COPD Brother     Migraines Daughter     Thyroid disease Maternal Aunt     Blindness Neg Hx     Cataracts Neg Hx     Glaucoma Neg Hx     Hypertension Neg Hx     Stroke Neg Hx     Amblyopia Neg Hx     Melanoma Neg Hx        Social History     Socioeconomic History    Marital status:      Spouse name: Not on file    Number of children: Not on file    Years of education: Not on file    Highest education level: Not on file   Occupational History    Occupation: teacher   Social Needs    Financial resource strain: Not very hard    Food insecurity     Worry: Never true     Inability: Never true    Transportation needs     Medical: No     Non-medical: No   Tobacco Use    Smoking status: Former Smoker     Packs/day: 2.00     Years: 36.00     Pack years: 72.00     Types: Cigarettes     Start date: 1955     Quit date: 1991     Years since quittin.7    Smokeless tobacco: Never Used    Tobacco comment: former smoker   Substance and Sexual Activity    Alcohol use: Yes     Comment: Rarely    Drug use: No    Sexual activity: Never   Lifestyle    Physical activity     Days per week: Not on file     Minutes per session: Not on file    Stress: Not on file   Relationships    Social connections     Talks on phone: Not on file     Gets together: Not on file     Attends Sabianism service: Not on file     Active member of club or organization: Not on file     Attends meetings of clubs or organizations: Not on file     Relationship status: Not on file   Other Topics Concern    Are you pregnant or think you may be? No    Breast-feeding No    Social History Narrative    Not on file       No current outpatient medications on file.     No current facility-administered medications for this visit.        Review of patient's allergies indicates:  No Known Allergies        Review of Systems   Unable to perform ROS: dementia           Objective:      Physical Exam  Constitutional:       General: She is not in acute distress.     Appearance: She is not ill-appearing.   Cardiovascular:      Pulses:           Dorsalis pedis pulses are 2+ on the right side and 2+ on the left side.        Posterior tibial pulses are 2+ on the right side and 2+ on the left side.      Comments: No lower extremity edema bilateral and skin temp is warm to feet bilateral.  Musculoskeletal:      Right foot: Normal range of motion. Prominent metatarsal heads present. No deformity, bunion, Charcot foot or foot drop.      Left foot: Normal range of motion. Prominent metatarsal heads present. No deformity, bunion, Charcot foot or foot drop.      Comments: No pain range of motion or manual muscle strength testing bilateral foot ankle.    Cavus foot structure bilateral plantar flexed 1st ray.   Feet:      Right foot:      Skin integrity: No ulcer, blister, skin breakdown, erythema, warmth, callus, dry skin or fissure.      Toenail Condition: Right toenails are abnormally thick and long. Fungal disease present.     Left foot:      Skin integrity: No ulcer, blister, skin breakdown, erythema, warmth, callus, dry skin or fissure.      Toenail Condition: Left toenails are abnormally thick and long. Fungal disease present.     Comments: Unable to check Polo-Jenn monofilament testing secondary to dementia.  Did check vibratory sensation which appear to be absent right foot and diminished left foot.  Skin:     General: Skin is warm.      Capillary Refill: Capillary refill takes less than 2 seconds.      Findings: No erythema.      Nails: There is no clubbing.        Comments: Nails 1-5  bilateral foot are moderately elongated 7-8 mm, with patchy yellow discoloration involving the distal halves.  The hallux nail bilateral significantly thickened with moderate incurvated medial lateral nail borders.    No open lesions or macerations of foot bilateral.    Skin turgor and elasticity within normal limits lower extremity bilateral.   Neurological:      Mental Status: She is alert.               Assessment:       Encounter Diagnoses   Name Primary?    Late onset Alzheimer's disease with behavioral disturbance Yes    Type 2 diabetes mellitus with stage 2 chronic kidney disease, without long-term current use of insulin     Neuropathy due to type 2 diabetes mellitus     Onychomycosis          Plan:       Kayla was seen today for diabetic foot exam.    Diagnoses and all orders for this visit:    Late onset Alzheimer's disease with behavioral disturbance  -     Routine Foot Care    Type 2 diabetes mellitus with stage 2 chronic kidney disease, without long-term current use of insulin  -     Routine Foot Care    Neuropathy due to type 2 diabetes mellitus  -     Routine Foot Care    Onychomycosis  -     Routine Foot Care      I counseled the patient on her conditions, their implications and medical management.    Shoe inspection. Diabetic Foot Education. Patient reminded of the importance of good nutrition and blood sugar control to help prevent podiatric complications of diabetes. Patient instructed on proper foot hygeine. We discussed wearing proper shoe gear, daily foot inspections, never walking without protective shoe gear, never putting sharp instruments to feet, routine podiatric nail visits every 2-3 months.      Routine foot care per attached note. She will continue to monitor the areas daily, inspect her feet, wear protective shoe gear when ambulatory, moisturizer to maintain skin integrity and follow in this office in approximately 2-3 months, sooner p.r.n.    Comprehensive diabetic foot exam  completed today however limited secondary to patient's history dementia.    A portion of this note was generated by voice recognition software and may contain spelling and grammar errors.

## 2020-11-06 ENCOUNTER — LAB VISIT (OUTPATIENT)
Dept: PRIMARY CARE CLINIC | Facility: OTHER | Age: 80
End: 2020-11-06
Attending: INTERNAL MEDICINE
Payer: MEDICARE

## 2020-11-06 DIAGNOSIS — Z03.818 ENCOUNTER FOR OBSERVATION FOR SUSPECTED EXPOSURE TO OTHER BIOLOGICAL AGENTS RULED OUT: ICD-10-CM

## 2020-11-06 PROCEDURE — U0003 INFECTIOUS AGENT DETECTION BY NUCLEIC ACID (DNA OR RNA); SEVERE ACUTE RESPIRATORY SYNDROME CORONAVIRUS 2 (SARS-COV-2) (CORONAVIRUS DISEASE [COVID-19]), AMPLIFIED PROBE TECHNIQUE, MAKING USE OF HIGH THROUGHPUT TECHNOLOGIES AS DESCRIBED BY CMS-2020-01-R: HCPCS

## 2020-11-07 DIAGNOSIS — U07.1 COVID-19 VIRUS DETECTED: ICD-10-CM

## 2020-11-07 LAB — SARS-COV-2 RNA RESP QL NAA+PROBE: DETECTED

## 2020-11-20 ENCOUNTER — TELEPHONE (OUTPATIENT)
Dept: PRIMARY CARE CLINIC | Facility: CLINIC | Age: 80
End: 2020-11-20

## 2020-11-20 DIAGNOSIS — G30.1 LATE ONSET ALZHEIMER'S DISEASE WITH BEHAVIORAL DISTURBANCE: Primary | ICD-10-CM

## 2020-11-20 DIAGNOSIS — F02.818 LATE ONSET ALZHEIMER'S DISEASE WITH BEHAVIORAL DISTURBANCE: Primary | ICD-10-CM

## 2020-11-20 NOTE — TELEPHONE ENCOUNTER
Please let Ailyn know that I ordered HH and mobile NP palliative care.     I am also involving the [facility] group to help her manage the dementia.    We may need to discuss a memory care living situation if Ailyn has enough money in savings.    Thanks,  KJ

## 2020-11-20 NOTE — TELEPHONE ENCOUNTER
----- Message from Kamilla Marcano sent at 11/20/2020 12:48 PM CST -----  Regarding: Ailyn daughter   Patients daughter called in regards to an order for home health care for a nurse she was checking the status of the order please advise

## 2020-11-20 NOTE — TELEPHONE ENCOUNTER
Spoke with Ailyn, she is looking for home health orders.   Pt is worsening with confusion and irate.

## 2020-11-23 ENCOUNTER — TELEPHONE (OUTPATIENT)
Dept: PRIMARY CARE CLINIC | Facility: CLINIC | Age: 80
End: 2020-11-23

## 2020-11-23 NOTE — TELEPHONE ENCOUNTER
Please talk to patient's step-daughter and see how she is doing. HH can't start until next week, does that work for her?    I also ordered a mobile NP palliative visit. Has she heard from them?    Thanks,  THOR Ruiz MD; MICHAEL Owens Staff   Phone Number: [phone]             Thank you for choosing [facility]. We will begin services on 12/8/2020. Will this start of care date be accepted?

## 2020-11-23 NOTE — TELEPHONE ENCOUNTER
VERENA Newman Lois  [phone]  2 hours ago (9:45 AM)     spoke to pt daughter Ailyn informed about HH and NP that will work for them    Outgoing call

## 2020-11-30 ENCOUNTER — CARE AT HOME (OUTPATIENT)
Dept: HOME HEALTH SERVICES | Facility: CLINIC | Age: 80
End: 2020-11-30
Payer: MEDICARE

## 2020-11-30 DIAGNOSIS — G30.1 LATE ONSET ALZHEIMER'S DISEASE WITH BEHAVIORAL DISTURBANCE: ICD-10-CM

## 2020-11-30 DIAGNOSIS — R53.83 FATIGUE, UNSPECIFIED TYPE: ICD-10-CM

## 2020-11-30 DIAGNOSIS — R45.1 RESTLESSNESS AND AGITATION: ICD-10-CM

## 2020-11-30 DIAGNOSIS — Z51.5 PALLIATIVE CARE ENCOUNTER: Primary | ICD-10-CM

## 2020-11-30 DIAGNOSIS — Z71.89 COUNSELING REGARDING ADVANCE CARE PLANNING AND GOALS OF CARE: ICD-10-CM

## 2020-11-30 DIAGNOSIS — F02.818 LATE ONSET ALZHEIMER'S DISEASE WITH BEHAVIORAL DISTURBANCE: ICD-10-CM

## 2020-11-30 PROCEDURE — 3078F DIAST BP <80 MM HG: CPT | Mod: CPTII,S$GLB,, | Performed by: NURSE PRACTITIONER

## 2020-11-30 PROCEDURE — 99497 PR ADVNCD CARE PLAN 30 MIN: ICD-10-PCS | Mod: 25,S$GLB,, | Performed by: NURSE PRACTITIONER

## 2020-11-30 PROCEDURE — 3077F PR MOST RECENT SYSTOLIC BLOOD PRESSURE >= 140 MM HG: ICD-10-PCS | Mod: CPTII,S$GLB,, | Performed by: NURSE PRACTITIONER

## 2020-11-30 PROCEDURE — 1159F PR MEDICATION LIST DOCUMENTED IN MEDICAL RECORD: ICD-10-PCS | Mod: S$GLB,,, | Performed by: NURSE PRACTITIONER

## 2020-11-30 PROCEDURE — 99497 ADVNCD CARE PLAN 30 MIN: CPT | Mod: 25,S$GLB,, | Performed by: NURSE PRACTITIONER

## 2020-11-30 PROCEDURE — 3078F PR MOST RECENT DIASTOLIC BLOOD PRESSURE < 80 MM HG: ICD-10-PCS | Mod: CPTII,S$GLB,, | Performed by: NURSE PRACTITIONER

## 2020-11-30 PROCEDURE — 99350 PR HOME VISIT,ESTAB PATIENT,LEVEL IV: ICD-10-PCS | Mod: S$GLB,,, | Performed by: NURSE PRACTITIONER

## 2020-11-30 PROCEDURE — 3077F SYST BP >= 140 MM HG: CPT | Mod: CPTII,S$GLB,, | Performed by: NURSE PRACTITIONER

## 2020-11-30 PROCEDURE — 99350 HOME/RES VST EST HIGH MDM 60: CPT | Mod: S$GLB,,, | Performed by: NURSE PRACTITIONER

## 2020-11-30 PROCEDURE — 1159F MED LIST DOCD IN RCRD: CPT | Mod: S$GLB,,, | Performed by: NURSE PRACTITIONER

## 2020-12-14 ENCOUNTER — OFFICE VISIT (OUTPATIENT)
Dept: PRIMARY CARE CLINIC | Facility: CLINIC | Age: 80
End: 2020-12-14

## 2020-12-14 DIAGNOSIS — G30.1 LATE ONSET ALZHEIMER'S DISEASE WITH BEHAVIORAL DISTURBANCE: Primary | ICD-10-CM

## 2020-12-14 DIAGNOSIS — F02.818 LATE ONSET ALZHEIMER'S DISEASE WITH BEHAVIORAL DISTURBANCE: Primary | ICD-10-CM

## 2020-12-14 PROCEDURE — 99443 PR PHYSICIAN TELEPHONE EVALUATION 21-30 MIN: ICD-10-PCS | Mod: 95,,, | Performed by: INTERNAL MEDICINE

## 2020-12-14 PROCEDURE — 99443 PR PHYSICIAN TELEPHONE EVALUATION 21-30 MIN: CPT | Mod: 95,,, | Performed by: INTERNAL MEDICINE

## 2020-12-14 RX ORDER — QUETIAPINE FUMARATE 25 MG/1
25 TABLET, FILM COATED ORAL DAILY
Qty: 30 TABLET | Refills: 11 | Status: SHIPPED | OUTPATIENT
Start: 2020-12-14 | End: 2021-08-12 | Stop reason: SDUPTHER

## 2020-12-14 NOTE — PROGRESS NOTES
Established Patient - Audio Only Telehealth Visit with [facility] Provider     The patient location is: HOME  The chief complaint leading to consultation is: routine care  Visit type: Virtual visit with audio only (telephone)     The reason for the audio only service rather than synchronous audio and video virtual visit was related to technical difficulties or patient preference/necessity.     Each patient to whom I provide medical services by telemedicine is:  (1) informed of the relationship between the physician and patient and the respective role of any other health care provider with respect to management of the patient; and (2) notified that they may decline to receive medical services by telemedicine and may withdraw from such care at any time. Patient verbally consented to receive this service via voice-only telephone call.       Subjective:      Patient ID: Kayla Sanchez is a 80 y.o. female dementia, homebound    Prior to this visit, patient's last encounter with PCP was 10/14/2020.    Patient's daughter and caretaker, Ailyn Krishna, was contacted. She prefers a call after 4pm.    Caretaker reports that Ms Sanchez is increasingly combative, verbally abusive. Patient     Palliative NP last came to home on 11/30/20 with NP Demesia. HH did not come to the home.    Objective:     Lab Results   Component Value Date    WBC 9.95 10/05/2020    HGB 14.1 10/05/2020    HCT 44.2 10/05/2020     10/05/2020    CHOL 180 09/16/2017    TRIG 142 09/16/2017    HDL 59 09/16/2017    ALT 21 10/05/2020    AST 19 10/05/2020     10/05/2020    K 4.2 10/05/2020     10/05/2020    CREATININE 1.1 10/05/2020    BUN 18 10/05/2020    CO2 23 10/05/2020    TSH 7.123 (H) 10/05/2020    HGBA1C 7.1 (H) 10/05/2020         Assessment:   80 y.o. female with multiple co-morbid illnesses here to continue work-up of chronic issues.     Plan:     Problem List Items Addressed This Visit        Neuro    Late onset Alzheimer's disease  "with behavioral disturbance - Primary     Progressive and worsening memory loss past year, requiring assistance with daily ADLs. Patient "escaped" through the window from step-daughter's home. AOX0  · Assist family with placement in facility per family preference  · Family prefers to continue supportive care in the home  · Home palliative NP following  · Message sent to [facility]  · Seroquel sent to [facility]         Relevant Medications    QUEtiapine (SEROQUEL) 25 MG Tab          Health Maintenance       Date Due Completion Date    Hepatitis C Screening 1940 ---    TETANUS VACCINE 06/09/1958 ---    Shingles Vaccine (2 of 3) 10/03/2013 8/8/2013    Urine Microalbumin 12/15/2017 12/15/2016    Lipid Panel 09/16/2018 9/16/2017    DEXA SCAN 12/16/2018 12/16/2016    Eye Exam 12/26/2019 12/26/2018    Influenza Vaccine (1) 08/01/2020 9/16/2017    Override on 1/6/2015: Done    Override on 11/11/2013: Done    Hemoglobin A1c 04/05/2021 10/5/2020    Foot Exam 09/02/2021 9/2/2020    Override on 1/20/2016: Done          Follow up in about 4 weeks (around 1/11/2021). . Thirty minutes spent with this patient today, including addressing advanced care planning.    Annette Ruiz MD/MPH  Internal Medicine  [facility]  Spectra [phone]    This service was not originating from a related E/M service provided within the previous 7 days nor will  to an E/M service or procedure within the next 24 hours or my soonest available appointment.  Prevailing standard of care was able to be met in this audio-only visit.   "

## 2020-12-14 NOTE — ASSESSMENT & PLAN NOTE
"Progressive and worsening memory loss past year, requiring assistance with daily ADLs. Patient "escaped" through the window from step-daughter's home. AOX0  · Assist family with placement in facility per family preference  · Family prefers to continue supportive care in the home  · Home palliative NP following  · Message sent to [facility]  · Seroquel sent to [facility]  "

## 2020-12-15 ENCOUNTER — TELEPHONE (OUTPATIENT)
Dept: PRIMARY CARE CLINIC | Facility: CLINIC | Age: 80
End: 2020-12-15

## 2020-12-15 NOTE — TELEPHONE ENCOUNTER
Left a message for [facility] Ms. Sullivan to call me back to confirm pt has been enrolled in [facility] [id number]

## 2020-12-16 ENCOUNTER — TELEPHONE (OUTPATIENT)
Dept: PRIMARY CARE CLINIC | Facility: CLINIC | Age: 80
End: 2020-12-16

## 2020-12-16 NOTE — TELEPHONE ENCOUNTER
Please let Ailyn know the details, she will need to be in the home to have the patient admitted to .    Thanks,  KJ

## 2020-12-16 NOTE — TELEPHONE ENCOUNTER
Called [facility] spoke to Barbara she stated pt admit is pending as of 12/08/2020 pt refused nurse in the home visit to complete admit papers

## 2020-12-16 NOTE — TELEPHONE ENCOUNTER
Please let patient's caretaker (Ailyn Krishna [phone]) know that patient refused to allow the HH nurse to admit her.     Ailyn likely needs to be home with the patient when HH admits her. Please give Ailyn the  contact number.    Thanks,  KJ

## 2020-12-16 NOTE — TELEPHONE ENCOUNTER
Spoke to Barbara [facility] she stated she will have a nurse to go out on 12/22/2020 to admit pt will call pt first to let her know they will be there Tuesday

## 2020-12-16 NOTE — TELEPHONE ENCOUNTER
Spoke to Ailyn she stated no one tried to contact the pt from [facility] to do an admit I informed Ailyn that I will call [facility] and get someone to come out and admit pt I informed Ailyn someone from [facility] will call pt before coming out

## 2020-12-23 NOTE — TELEPHONE ENCOUNTER
[facility] called. She stated that they have tried to admit this pt to HH and they have been very unsuccessful.     They did speak with the pt dtr, but the dtr will not answer the phone or call HH back.    For now, they will not attempt to admit this pt unless something changes.

## 2021-01-03 VITALS
RESPIRATION RATE: 20 BRPM | SYSTOLIC BLOOD PRESSURE: 140 MMHG | TEMPERATURE: 97 F | DIASTOLIC BLOOD PRESSURE: 78 MMHG | HEART RATE: 89 BPM | OXYGEN SATURATION: 95 %

## 2021-01-10 ENCOUNTER — OFFICE VISIT (OUTPATIENT)
Dept: URGENT CARE | Facility: CLINIC | Age: 81
End: 2021-01-10
Payer: MEDICARE

## 2021-01-10 VITALS
WEIGHT: 169 LBS | OXYGEN SATURATION: 97 % | TEMPERATURE: 98 F | SYSTOLIC BLOOD PRESSURE: 158 MMHG | BODY MASS INDEX: 28.16 KG/M2 | HEIGHT: 65 IN | DIASTOLIC BLOOD PRESSURE: 89 MMHG | HEART RATE: 65 BPM

## 2021-01-10 DIAGNOSIS — S01.01XA LACERATION OF OCCIPITAL SCALP, INITIAL ENCOUNTER: Primary | ICD-10-CM

## 2021-01-10 PROCEDURE — 1157F ADVNC CARE PLAN IN RCRD: CPT | Mod: S$GLB,,, | Performed by: FAMILY MEDICINE

## 2021-01-10 PROCEDURE — 99214 PR OFFICE/OUTPT VISIT, EST, LEVL IV, 30-39 MIN: ICD-10-PCS | Mod: S$GLB,,, | Performed by: FAMILY MEDICINE

## 2021-01-10 PROCEDURE — 1157F PR ADVANCE CARE PLAN OR EQUIV PRESENT IN MEDICAL RECORD: ICD-10-PCS | Mod: S$GLB,,, | Performed by: FAMILY MEDICINE

## 2021-01-10 PROCEDURE — 99214 OFFICE O/P EST MOD 30 MIN: CPT | Mod: S$GLB,,, | Performed by: FAMILY MEDICINE

## 2021-01-10 RX ORDER — MUPIROCIN 20 MG/G
OINTMENT TOPICAL
Qty: 22 G | Refills: 1 | Status: SHIPPED | OUTPATIENT
Start: 2021-01-10 | End: 2021-11-05

## 2021-01-11 ENCOUNTER — TELEPHONE (OUTPATIENT)
Dept: PRIMARY CARE CLINIC | Facility: CLINIC | Age: 81
End: 2021-01-11

## 2021-01-11 NOTE — TELEPHONE ENCOUNTER
Spoke to pt caregiver Ailyn she stated pt was set up with HH but no one has come out yet please advise     Does she need appt? No     How is she doing? Ailyn stated pt is doing fine but caregiver want to know can you look at the meds list and see if you can give pt something that will not have pt so drowsy

## 2021-01-11 NOTE — TELEPHONE ENCOUNTER
Please call patient's caretaker. Patient was seen in  this weekend for fall and head laceration. Does she need appt? How is she doing?    Thanks,  KJ

## 2021-01-12 NOTE — TELEPHONE ENCOUNTER
Please let caregiver know that HH has called her numerous times to set up her HH intake but she has not answered the phone. She needs to answer the phone for them to come out.    The seroquel can make her sedated. She is taking that to treat agitation and sundowning, but her caretaker can stop this and see if it helps her feel less sedated.    Thanks,  THOR

## 2021-01-22 ENCOUNTER — CARE AT HOME (OUTPATIENT)
Dept: HOME HEALTH SERVICES | Facility: CLINIC | Age: 81
End: 2021-01-22
Payer: MEDICARE

## 2021-01-22 DIAGNOSIS — Z51.5 PALLIATIVE CARE ENCOUNTER: Primary | ICD-10-CM
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
## 2021-01-22 DIAGNOSIS — S01.01XA LACERATION OF SCALP WITHOUT FOREIGN BODY, INITIAL ENCOUNTER: ICD-10-CM

## 2021-01-22 DIAGNOSIS — F02.818 LATE ONSET ALZHEIMER'S DISEASE WITH BEHAVIORAL DISTURBANCE: ICD-10-CM

## 2021-01-22 DIAGNOSIS — R39.9 UTI SYMPTOMS: ICD-10-CM

## 2021-01-22 DIAGNOSIS — R41.0 CONFUSION: ICD-10-CM

## 2021-01-22 DIAGNOSIS — G30.1 LATE ONSET ALZHEIMER'S DISEASE WITH BEHAVIORAL DISTURBANCE: ICD-10-CM

## 2021-01-22 PROCEDURE — 3078F PR MOST RECENT DIASTOLIC BLOOD PRESSURE < 80 MM HG: ICD-10-PCS | Mod: CPTII,S$GLB,, | Performed by: NURSE PRACTITIONER

## 2021-01-22 PROCEDURE — 3075F PR MOST RECENT SYSTOLIC BLOOD PRESS GE 130-139MM HG: ICD-10-PCS | Mod: CPTII,S$GLB,, | Performed by: NURSE PRACTITIONER

## 2021-01-22 PROCEDURE — 1159F PR MEDICATION LIST DOCUMENTED IN MEDICAL RECORD: ICD-10-PCS | Mod: S$GLB,,, | Performed by: NURSE PRACTITIONER

## 2021-01-22 PROCEDURE — 3075F SYST BP GE 130 - 139MM HG: CPT | Mod: CPTII,S$GLB,, | Performed by: NURSE PRACTITIONER

## 2021-01-22 PROCEDURE — 99350 PR HOME VISIT,ESTAB PATIENT,LEVEL IV: ICD-10-PCS | Mod: S$GLB,,, | Performed by: NURSE PRACTITIONER

## 2021-01-22 PROCEDURE — 1159F MED LIST DOCD IN RCRD: CPT | Mod: S$GLB,,, | Performed by: NURSE PRACTITIONER

## 2021-01-22 PROCEDURE — 99350 HOME/RES VST EST HIGH MDM 60: CPT | Mod: S$GLB,,, | Performed by: NURSE PRACTITIONER

## 2021-01-22 PROCEDURE — 3078F DIAST BP <80 MM HG: CPT | Mod: CPTII,S$GLB,, | Performed by: NURSE PRACTITIONER

## 2021-01-22 RX ORDER — CEPHALEXIN 500 MG/1
500 CAPSULE ORAL EVERY 12 HOURS
Qty: 10 CAPSULE | Refills: 0 | Status: SHIPPED | OUTPATIENT
Start: 2021-01-22 | End: 2021-01-27

## 2021-01-25 ENCOUNTER — TELEPHONE (OUTPATIENT)
Dept: PRIMARY CARE CLINIC | Facility: CLINIC | Age: 81
End: 2021-01-25

## 2021-02-05 ENCOUNTER — LAB VISIT (OUTPATIENT)
Dept: LAB | Facility: HOSPITAL | Age: 81
End: 2021-02-05
Attending: INTERNAL MEDICINE
Payer: MEDICARE

## 2021-02-05 ENCOUNTER — OFFICE VISIT (OUTPATIENT)
Dept: PRIMARY CARE CLINIC | Facility: CLINIC | Age: 81
End: 2021-02-05
Payer: MEDICARE

## 2021-02-05 ENCOUNTER — TELEPHONE (OUTPATIENT)
Dept: ADMINISTRATIVE | Facility: OTHER | Age: 81
End: 2021-02-05

## 2021-02-05 VITALS
DIASTOLIC BLOOD PRESSURE: 80 MMHG | HEIGHT: 66 IN | WEIGHT: 166.44 LBS | BODY MASS INDEX: 26.75 KG/M2 | HEART RATE: 65 BPM | SYSTOLIC BLOOD PRESSURE: 144 MMHG

## 2021-02-05 DIAGNOSIS — D69.2 SENILE PURPURA: ICD-10-CM

## 2021-02-05 DIAGNOSIS — E03.9 HYPOTHYROIDISM, UNSPECIFIED TYPE: ICD-10-CM

## 2021-02-05 DIAGNOSIS — F02.818 LATE ONSET ALZHEIMER'S DISEASE WITH BEHAVIORAL DISTURBANCE: ICD-10-CM

## 2021-02-05 DIAGNOSIS — E03.9 HYPOTHYROIDISM, UNSPECIFIED TYPE: Primary | ICD-10-CM

## 2021-02-05 DIAGNOSIS — I70.0 AORTIC ATHEROSCLEROSIS: ICD-10-CM

## 2021-02-05 DIAGNOSIS — G30.1 LATE ONSET ALZHEIMER'S DISEASE WITH BEHAVIORAL DISTURBANCE: ICD-10-CM

## 2021-02-05 DIAGNOSIS — Z66 DO NOT RESUSCITATE: ICD-10-CM

## 2021-02-05 DIAGNOSIS — N18.2 TYPE 2 DIABETES MELLITUS WITH STAGE 2 CHRONIC KIDNEY DISEASE, WITHOUT LONG-TERM CURRENT USE OF INSULIN: ICD-10-CM

## 2021-02-05 DIAGNOSIS — E11.22 TYPE 2 DIABETES MELLITUS WITH STAGE 2 CHRONIC KIDNEY DISEASE, WITHOUT LONG-TERM CURRENT USE OF INSULIN: ICD-10-CM

## 2021-02-05 PROCEDURE — 3288F FALL RISK ASSESSMENT DOCD: CPT | Mod: CPTII,S$GLB,, | Performed by: INTERNAL MEDICINE

## 2021-02-05 PROCEDURE — 3077F SYST BP >= 140 MM HG: CPT | Mod: CPTII,S$GLB,, | Performed by: INTERNAL MEDICINE

## 2021-02-05 PROCEDURE — 1157F ADVNC CARE PLAN IN RCRD: CPT | Mod: S$GLB,,, | Performed by: INTERNAL MEDICINE

## 2021-02-05 PROCEDURE — 1126F AMNT PAIN NOTED NONE PRSNT: CPT | Mod: S$GLB,,, | Performed by: INTERNAL MEDICINE

## 2021-02-05 PROCEDURE — 1159F MED LIST DOCD IN RCRD: CPT | Mod: S$GLB,,, | Performed by: INTERNAL MEDICINE

## 2021-02-05 PROCEDURE — 3288F PR FALLS RISK ASSESSMENT DOCUMENTED: ICD-10-PCS | Mod: CPTII,S$GLB,, | Performed by: INTERNAL MEDICINE

## 2021-02-05 PROCEDURE — 1100F PR PT FALLS ASSESS DOC 2+ FALLS/FALL W/INJURY/YR: ICD-10-PCS | Mod: CPTII,S$GLB,, | Performed by: INTERNAL MEDICINE

## 2021-02-05 PROCEDURE — 36415 COLL VENOUS BLD VENIPUNCTURE: CPT

## 2021-02-05 PROCEDURE — 83036 HEMOGLOBIN GLYCOSYLATED A1C: CPT

## 2021-02-05 PROCEDURE — 3079F PR MOST RECENT DIASTOLIC BLOOD PRESSURE 80-89 MM HG: ICD-10-PCS | Mod: CPTII,S$GLB,, | Performed by: INTERNAL MEDICINE

## 2021-02-05 PROCEDURE — 99499 UNLISTED E&M SERVICE: CPT | Mod: S$GLB,,, | Performed by: INTERNAL MEDICINE

## 2021-02-05 PROCEDURE — 1159F PR MEDICATION LIST DOCUMENTED IN MEDICAL RECORD: ICD-10-PCS | Mod: S$GLB,,, | Performed by: INTERNAL MEDICINE

## 2021-02-05 PROCEDURE — 3079F DIAST BP 80-89 MM HG: CPT | Mod: CPTII,S$GLB,, | Performed by: INTERNAL MEDICINE

## 2021-02-05 PROCEDURE — 1126F PR PAIN SEVERITY QUANTIFIED, NO PAIN PRESENT: ICD-10-PCS | Mod: S$GLB,,, | Performed by: INTERNAL MEDICINE

## 2021-02-05 PROCEDURE — 1100F PTFALLS ASSESS-DOCD GE2>/YR: CPT | Mod: CPTII,S$GLB,, | Performed by: INTERNAL MEDICINE

## 2021-02-05 PROCEDURE — 1157F PR ADVANCE CARE PLAN OR EQUIV PRESENT IN MEDICAL RECORD: ICD-10-PCS | Mod: S$GLB,,, | Performed by: INTERNAL MEDICINE

## 2021-02-05 PROCEDURE — 3051F PR MOST RECENT HEMOGLOBIN A1C LEVEL 7.0 - < 8.0%: ICD-10-PCS | Mod: CPTII,S$GLB,, | Performed by: INTERNAL MEDICINE

## 2021-02-05 PROCEDURE — 3077F PR MOST RECENT SYSTOLIC BLOOD PRESSURE >= 140 MM HG: ICD-10-PCS | Mod: CPTII,S$GLB,, | Performed by: INTERNAL MEDICINE

## 2021-02-05 PROCEDURE — 84443 ASSAY THYROID STIM HORMONE: CPT

## 2021-02-05 PROCEDURE — 3051F HG A1C>EQUAL 7.0%<8.0%: CPT | Mod: CPTII,S$GLB,, | Performed by: INTERNAL MEDICINE

## 2021-02-05 PROCEDURE — 99215 PR OFFICE/OUTPT VISIT, EST, LEVL V, 40-54 MIN: ICD-10-PCS | Mod: S$GLB,,, | Performed by: INTERNAL MEDICINE

## 2021-02-05 PROCEDURE — 99499 RISK ADDL DX/OHS AUDIT: ICD-10-PCS | Mod: S$GLB,,, | Performed by: INTERNAL MEDICINE

## 2021-02-05 PROCEDURE — 99215 OFFICE O/P EST HI 40 MIN: CPT | Mod: S$GLB,,, | Performed by: INTERNAL MEDICINE

## 2021-02-05 PROCEDURE — 84439 ASSAY OF FREE THYROXINE: CPT

## 2021-02-06 LAB
ESTIMATED AVG GLUCOSE: 154 MG/DL (ref 68–131)
HBA1C MFR BLD: 7 % (ref 4–5.6)
T4 FREE SERPL-MCNC: 0.91 NG/DL (ref 0.71–1.51)
TSH SERPL DL<=0.005 MIU/L-ACNC: 4.53 UIU/ML (ref 0.4–4)

## 2021-02-12 ENCOUNTER — TELEPHONE (OUTPATIENT)
Dept: PRIMARY CARE CLINIC | Facility: CLINIC | Age: 81
End: 2021-02-12

## 2021-02-12 NOTE — TELEPHONE ENCOUNTER
Please let patient's daughter know that her TSH was slightly high at 4, but she doesn't need to take levothyroxine because a TSH of 4 is ok for her age.    Her A1c was 7. Which is at goal for her age.    Thanks,  KJ

## 2021-02-21 VITALS
DIASTOLIC BLOOD PRESSURE: 60 MMHG | SYSTOLIC BLOOD PRESSURE: 132 MMHG | HEART RATE: 76 BPM | RESPIRATION RATE: 18 BRPM | OXYGEN SATURATION: 96 % | TEMPERATURE: 98 F

## 2021-03-24 ENCOUNTER — TELEPHONE (OUTPATIENT)
Dept: PRIMARY CARE CLINIC | Facility: CLINIC | Age: 81
End: 2021-03-24

## 2021-03-25 ENCOUNTER — CARE AT HOME (OUTPATIENT)
Dept: HOME HEALTH SERVICES | Facility: CLINIC | Age: 81
End: 2021-03-25
Payer: MEDICARE

## 2021-03-25 DIAGNOSIS — R41.0 CONFUSION: ICD-10-CM

## 2021-03-25 DIAGNOSIS — Z71.89 COUNSELING REGARDING ADVANCE CARE PLANNING AND GOALS OF CARE: ICD-10-CM

## 2021-03-25 DIAGNOSIS — G30.1 LATE ONSET ALZHEIMER'S DISEASE WITH BEHAVIORAL DISTURBANCE: ICD-10-CM

## 2021-03-25 DIAGNOSIS — F02.818 LATE ONSET ALZHEIMER'S DISEASE WITH BEHAVIORAL DISTURBANCE: ICD-10-CM

## 2021-03-25 DIAGNOSIS — Z51.5 PALLIATIVE CARE ENCOUNTER: Primary | ICD-10-CM

## 2021-03-25 PROCEDURE — 99350 HOME/RES VST EST HIGH MDM 60: CPT | Mod: S$GLB,,, | Performed by: NURSE PRACTITIONER

## 2021-03-25 PROCEDURE — 1159F PR MEDICATION LIST DOCUMENTED IN MEDICAL RECORD: ICD-10-PCS | Mod: S$GLB,,, | Performed by: NURSE PRACTITIONER

## 2021-03-25 PROCEDURE — 1159F MED LIST DOCD IN RCRD: CPT | Mod: S$GLB,,, | Performed by: NURSE PRACTITIONER

## 2021-03-25 PROCEDURE — 99350 PR HOME VISIT,ESTAB PATIENT,LEVEL IV: ICD-10-PCS | Mod: S$GLB,,, | Performed by: NURSE PRACTITIONER

## 2021-03-29 ENCOUNTER — PES CALL (OUTPATIENT)
Dept: ADMINISTRATIVE | Facility: CLINIC | Age: 81
End: 2021-03-29

## 2021-04-25 VITALS
DIASTOLIC BLOOD PRESSURE: 90 MMHG | HEART RATE: 70 BPM | SYSTOLIC BLOOD PRESSURE: 150 MMHG | OXYGEN SATURATION: 96 % | RESPIRATION RATE: 18 BRPM | TEMPERATURE: 98 F

## 2021-05-11 ENCOUNTER — CARE AT HOME (OUTPATIENT)
Dept: HOME HEALTH SERVICES | Facility: CLINIC | Age: 81
End: 2021-05-11
Payer: MEDICARE

## 2021-05-11 DIAGNOSIS — F02.818 LATE ONSET ALZHEIMER'S DISEASE WITH BEHAVIORAL DISTURBANCE: ICD-10-CM

## 2021-05-11 DIAGNOSIS — Z71.89 COUNSELING REGARDING ADVANCE CARE PLANNING AND GOALS OF CARE: ICD-10-CM

## 2021-05-11 DIAGNOSIS — I10 HYPERTENSION, UNSPECIFIED TYPE: ICD-10-CM

## 2021-05-11 DIAGNOSIS — Z51.5 PALLIATIVE CARE ENCOUNTER: Primary | ICD-10-CM

## 2021-05-11 DIAGNOSIS — R41.0 CONFUSION: ICD-10-CM

## 2021-05-11 DIAGNOSIS — G30.1 LATE ONSET ALZHEIMER'S DISEASE WITH BEHAVIORAL DISTURBANCE: ICD-10-CM

## 2021-05-11 PROCEDURE — 1159F PR MEDICATION LIST DOCUMENTED IN MEDICAL RECORD: ICD-10-PCS | Mod: S$GLB,,, | Performed by: NURSE PRACTITIONER

## 2021-05-11 PROCEDURE — 99350 PR HOME VISIT,ESTAB PATIENT,LEVEL IV: ICD-10-PCS | Mod: S$GLB,,, | Performed by: NURSE PRACTITIONER

## 2021-05-11 PROCEDURE — 1159F MED LIST DOCD IN RCRD: CPT | Mod: S$GLB,,, | Performed by: NURSE PRACTITIONER

## 2021-05-11 PROCEDURE — 99350 HOME/RES VST EST HIGH MDM 60: CPT | Mod: S$GLB,,, | Performed by: NURSE PRACTITIONER

## 2021-06-27 VITALS
HEART RATE: 71 BPM | SYSTOLIC BLOOD PRESSURE: 170 MMHG | TEMPERATURE: 98 F | DIASTOLIC BLOOD PRESSURE: 92 MMHG | RESPIRATION RATE: 20 BRPM | OXYGEN SATURATION: 95 %

## 2021-07-07 ENCOUNTER — CARE AT HOME (OUTPATIENT)
Dept: HOME HEALTH SERVICES | Facility: CLINIC | Age: 81
End: 2021-07-07
Payer: MEDICARE

## 2021-07-07 DIAGNOSIS — G30.1 LATE ONSET ALZHEIMER'S DISEASE WITH BEHAVIORAL DISTURBANCE: ICD-10-CM

## 2021-07-07 DIAGNOSIS — Z71.89 COUNSELING REGARDING ADVANCE CARE PLANNING AND GOALS OF CARE: ICD-10-CM

## 2021-07-07 DIAGNOSIS — F02.818 LATE ONSET ALZHEIMER'S DISEASE WITH BEHAVIORAL DISTURBANCE: ICD-10-CM

## 2021-07-07 DIAGNOSIS — G89.29 CHRONIC PAIN OF BOTH KNEES: ICD-10-CM

## 2021-07-07 DIAGNOSIS — M25.561 CHRONIC PAIN OF BOTH KNEES: ICD-10-CM

## 2021-07-07 DIAGNOSIS — M25.562 CHRONIC PAIN OF BOTH KNEES: ICD-10-CM

## 2021-07-07 DIAGNOSIS — R41.0 CONFUSION: ICD-10-CM

## 2021-07-07 DIAGNOSIS — Z51.5 PALLIATIVE CARE ENCOUNTER: Primary | ICD-10-CM

## 2021-07-07 PROCEDURE — 99350 PR HOME VISIT,ESTAB PATIENT,LEVEL IV: ICD-10-PCS | Mod: S$GLB,,, | Performed by: NURSE PRACTITIONER

## 2021-07-07 PROCEDURE — 99350 HOME/RES VST EST HIGH MDM 60: CPT | Mod: S$GLB,,, | Performed by: NURSE PRACTITIONER

## 2021-07-07 PROCEDURE — 99497 ADVNCD CARE PLAN 30 MIN: CPT | Mod: S$GLB,,, | Performed by: NURSE PRACTITIONER

## 2021-07-07 PROCEDURE — 1159F PR MEDICATION LIST DOCUMENTED IN MEDICAL RECORD: ICD-10-PCS | Mod: S$GLB,,, | Performed by: NURSE PRACTITIONER

## 2021-07-07 PROCEDURE — 99497 PR ADVNCD CARE PLAN 30 MIN: ICD-10-PCS | Mod: S$GLB,,, | Performed by: NURSE PRACTITIONER

## 2021-07-07 PROCEDURE — 1159F MED LIST DOCD IN RCRD: CPT | Mod: S$GLB,,, | Performed by: NURSE PRACTITIONER

## 2021-07-11 VITALS
DIASTOLIC BLOOD PRESSURE: 70 MMHG | HEART RATE: 69 BPM | TEMPERATURE: 98 F | RESPIRATION RATE: 20 BRPM | OXYGEN SATURATION: 95 % | SYSTOLIC BLOOD PRESSURE: 130 MMHG

## 2021-07-11 RX ORDER — DICLOFENAC SODIUM 10 MG/G
2 GEL TOPICAL 3 TIMES DAILY
Qty: 1 TUBE | Refills: 3 | Status: SHIPPED | OUTPATIENT
Start: 2021-07-11 | End: 2021-08-12 | Stop reason: SDUPTHER

## 2021-08-12 DIAGNOSIS — F02.818 LATE ONSET ALZHEIMER'S DISEASE WITH BEHAVIORAL DISTURBANCE: ICD-10-CM

## 2021-08-12 DIAGNOSIS — G30.1 LATE ONSET ALZHEIMER'S DISEASE WITH BEHAVIORAL DISTURBANCE: ICD-10-CM

## 2021-08-13 RX ORDER — DICLOFENAC SODIUM 10 MG/G
2 GEL TOPICAL 3 TIMES DAILY
Qty: 1 TUBE | Refills: 3 | Status: SHIPPED | OUTPATIENT
Start: 2021-08-13 | End: 2022-07-12 | Stop reason: SDUPTHER

## 2021-08-13 RX ORDER — QUETIAPINE FUMARATE 25 MG/1
25 TABLET, FILM COATED ORAL DAILY
Qty: 30 TABLET | Refills: 11 | Status: SHIPPED | OUTPATIENT
Start: 2021-08-13 | End: 2022-07-12 | Stop reason: SDUPTHER

## 2021-08-25 ENCOUNTER — CARE AT HOME (OUTPATIENT)
Dept: HOME HEALTH SERVICES | Facility: CLINIC | Age: 81
End: 2021-08-25
Payer: MEDICARE

## 2021-08-25 DIAGNOSIS — G30.1 LATE ONSET ALZHEIMER'S DISEASE WITH BEHAVIORAL DISTURBANCE: ICD-10-CM

## 2021-08-25 DIAGNOSIS — I10 HYPERTENSION, UNSPECIFIED TYPE: ICD-10-CM

## 2021-08-25 DIAGNOSIS — Z51.5 PALLIATIVE CARE ENCOUNTER: Primary | ICD-10-CM

## 2021-08-25 DIAGNOSIS — F02.818 LATE ONSET ALZHEIMER'S DISEASE WITH BEHAVIORAL DISTURBANCE: ICD-10-CM

## 2021-08-25 DIAGNOSIS — Z71.89 GOALS OF CARE, COUNSELING/DISCUSSION: ICD-10-CM

## 2021-08-25 DIAGNOSIS — R41.0 CONFUSION: ICD-10-CM

## 2021-08-25 PROCEDURE — 99350 HOME/RES VST EST HIGH MDM 60: CPT | Mod: S$GLB,,, | Performed by: NURSE PRACTITIONER

## 2021-08-25 PROCEDURE — 99350 PR HOME VISIT,ESTAB PATIENT,LEVEL IV: ICD-10-PCS | Mod: S$GLB,,, | Performed by: NURSE PRACTITIONER

## 2021-09-02 ENCOUNTER — OFFICE VISIT (OUTPATIENT)
Dept: PRIMARY CARE CLINIC | Facility: CLINIC | Age: 81
End: 2021-09-02
Payer: MEDICARE

## 2021-09-02 DIAGNOSIS — F02.818 LATE ONSET ALZHEIMER'S DISEASE WITH BEHAVIORAL DISTURBANCE: Primary | ICD-10-CM

## 2021-09-02 DIAGNOSIS — G30.1 LATE ONSET ALZHEIMER'S DISEASE WITH BEHAVIORAL DISTURBANCE: Primary | ICD-10-CM

## 2021-09-02 PROCEDURE — 1157F PR ADVANCE CARE PLAN OR EQUIV PRESENT IN MEDICAL RECORD: ICD-10-PCS | Mod: CPTII,95,, | Performed by: NURSE PRACTITIONER

## 2021-09-02 PROCEDURE — 1157F ADVNC CARE PLAN IN RCRD: CPT | Mod: CPTII,95,, | Performed by: NURSE PRACTITIONER

## 2021-09-02 PROCEDURE — 99441 PR PHYSICIAN TELEPHONE EVALUATION 5-10 MIN: CPT | Mod: 95,,, | Performed by: NURSE PRACTITIONER

## 2021-09-02 PROCEDURE — 99441 PR PHYSICIAN TELEPHONE EVALUATION 5-10 MIN: ICD-10-PCS | Mod: 95,,, | Performed by: NURSE PRACTITIONER

## 2021-10-05 ENCOUNTER — IMMUNIZATION (OUTPATIENT)
Dept: INTERNAL MEDICINE | Facility: CLINIC | Age: 81
End: 2021-10-05
Payer: MEDICARE

## 2021-10-05 ENCOUNTER — CARE AT HOME (OUTPATIENT)
Dept: HOME HEALTH SERVICES | Facility: CLINIC | Age: 81
End: 2021-10-05
Payer: MEDICARE

## 2021-10-05 DIAGNOSIS — F02.818 LATE ONSET ALZHEIMER'S DISEASE WITH BEHAVIORAL DISTURBANCE: ICD-10-CM

## 2021-10-05 DIAGNOSIS — G30.1 LATE ONSET ALZHEIMER'S DISEASE WITH BEHAVIORAL DISTURBANCE: ICD-10-CM

## 2021-10-05 DIAGNOSIS — R41.0 CONFUSION: ICD-10-CM

## 2021-10-05 DIAGNOSIS — Z51.5 PALLIATIVE CARE ENCOUNTER: Primary | ICD-10-CM

## 2021-10-05 DIAGNOSIS — Z23 FLU VACCINE NEED: ICD-10-CM

## 2021-10-05 PROCEDURE — 90694 FLU VACCINE - QUADRIVALENT - ADJUVANTED: ICD-10-PCS | Mod: HCNC,S$GLB,, | Performed by: NURSE PRACTITIONER

## 2021-10-05 PROCEDURE — 99350 HOME/RES VST EST HIGH MDM 60: CPT | Mod: S$GLB,,, | Performed by: NURSE PRACTITIONER

## 2021-10-05 PROCEDURE — G0008 PR ADMIN INFLUENZA VIRUS VAC: ICD-10-PCS | Mod: S$GLB,,, | Performed by: NURSE PRACTITIONER

## 2021-10-05 PROCEDURE — 99350 PR HOME VISIT,ESTAB PATIENT,LEVEL IV: ICD-10-PCS | Mod: S$GLB,,, | Performed by: NURSE PRACTITIONER

## 2021-10-05 PROCEDURE — G0008 FLU VACCINE - QUADRIVALENT - ADJUVANTED: ICD-10-PCS | Mod: HCNC,S$GLB,, | Performed by: NURSE PRACTITIONER

## 2021-10-05 PROCEDURE — G0008 ADMIN INFLUENZA VIRUS VAC: HCPCS | Mod: HCNC,S$GLB,, | Performed by: NURSE PRACTITIONER

## 2021-10-05 PROCEDURE — 90694 VACC AIIV4 NO PRSRV 0.5ML IM: CPT | Mod: HCNC,S$GLB,, | Performed by: NURSE PRACTITIONER

## 2021-10-05 PROCEDURE — 90654 PR  FLU VACCINE NO PRESERV, I: CPT | Mod: S$GLB,,, | Performed by: NURSE PRACTITIONER

## 2021-10-05 PROCEDURE — G0008 ADMIN INFLUENZA VIRUS VAC: HCPCS | Mod: S$GLB,,, | Performed by: NURSE PRACTITIONER

## 2021-10-05 PROCEDURE — 90654 PR  FLU VACCINE NO PRESERV, I: ICD-10-PCS | Mod: S$GLB,,, | Performed by: NURSE PRACTITIONER

## 2021-10-06 VITALS
TEMPERATURE: 98 F | DIASTOLIC BLOOD PRESSURE: 80 MMHG | SYSTOLIC BLOOD PRESSURE: 140 MMHG | OXYGEN SATURATION: 94 % | RESPIRATION RATE: 20 BRPM | HEART RATE: 62 BPM

## 2021-10-25 VITALS
RESPIRATION RATE: 20 BRPM | OXYGEN SATURATION: 96 % | TEMPERATURE: 98 F | SYSTOLIC BLOOD PRESSURE: 160 MMHG | DIASTOLIC BLOOD PRESSURE: 90 MMHG | HEART RATE: 59 BPM

## 2021-11-05 ENCOUNTER — OFFICE VISIT (OUTPATIENT)
Dept: PRIMARY CARE CLINIC | Facility: CLINIC | Age: 81
End: 2021-11-05
Payer: MEDICARE

## 2021-11-05 DIAGNOSIS — F02.818 LATE ONSET ALZHEIMER'S DISEASE WITH BEHAVIORAL DISTURBANCE: ICD-10-CM

## 2021-11-05 DIAGNOSIS — G30.1 LATE ONSET ALZHEIMER'S DISEASE WITH BEHAVIORAL DISTURBANCE: ICD-10-CM

## 2021-11-05 PROCEDURE — 1157F PR ADVANCE CARE PLAN OR EQUIV PRESENT IN MEDICAL RECORD: ICD-10-PCS | Mod: HCNC,CPTII,95, | Performed by: INTERNAL MEDICINE

## 2021-11-05 PROCEDURE — 99443 PR PHYSICIAN TELEPHONE EVALUATION 21-30 MIN: CPT | Mod: HCNC,95,, | Performed by: INTERNAL MEDICINE

## 2021-11-05 PROCEDURE — 1157F ADVNC CARE PLAN IN RCRD: CPT | Mod: HCNC,CPTII,95, | Performed by: INTERNAL MEDICINE

## 2021-11-05 PROCEDURE — 99443 PR PHYSICIAN TELEPHONE EVALUATION 21-30 MIN: ICD-10-PCS | Mod: HCNC,95,, | Performed by: INTERNAL MEDICINE

## 2021-11-30 ENCOUNTER — CARE AT HOME (OUTPATIENT)
Dept: HOME HEALTH SERVICES | Facility: CLINIC | Age: 81
End: 2021-11-30
Payer: MEDICARE

## 2021-11-30 DIAGNOSIS — Z71.89 GOALS OF CARE, COUNSELING/DISCUSSION: ICD-10-CM

## 2021-11-30 DIAGNOSIS — F02.818 LATE ONSET ALZHEIMER'S DISEASE WITH BEHAVIORAL DISTURBANCE: ICD-10-CM

## 2021-11-30 DIAGNOSIS — R41.0 CONFUSION: ICD-10-CM

## 2021-11-30 DIAGNOSIS — R04.0 BLEEDING FROM THE NOSE: ICD-10-CM

## 2021-11-30 DIAGNOSIS — Z51.5 PALLIATIVE CARE ENCOUNTER: Primary | ICD-10-CM

## 2021-11-30 DIAGNOSIS — G30.1 LATE ONSET ALZHEIMER'S DISEASE WITH BEHAVIORAL DISTURBANCE: ICD-10-CM

## 2021-11-30 PROCEDURE — 99350 PR HOME VISIT,ESTAB PATIENT,LEVEL IV: ICD-10-PCS | Mod: S$GLB,,, | Performed by: NURSE PRACTITIONER

## 2021-11-30 PROCEDURE — 99350 HOME/RES VST EST HIGH MDM 60: CPT | Mod: S$GLB,,, | Performed by: NURSE PRACTITIONER

## 2021-12-05 VITALS
DIASTOLIC BLOOD PRESSURE: 80 MMHG | OXYGEN SATURATION: 96 % | SYSTOLIC BLOOD PRESSURE: 130 MMHG | RESPIRATION RATE: 20 BRPM | TEMPERATURE: 98 F | HEART RATE: 81 BPM

## 2022-01-19 ENCOUNTER — CARE AT HOME (OUTPATIENT)
Dept: HOME HEALTH SERVICES | Facility: CLINIC | Age: 82
End: 2022-01-19
Payer: MEDICARE

## 2022-01-19 VITALS
SYSTOLIC BLOOD PRESSURE: 140 MMHG | HEART RATE: 77 BPM | OXYGEN SATURATION: 95 % | DIASTOLIC BLOOD PRESSURE: 88 MMHG | TEMPERATURE: 98 F | RESPIRATION RATE: 18 BRPM

## 2022-01-19 DIAGNOSIS — G30.1 LATE ONSET ALZHEIMER'S DISEASE WITH BEHAVIORAL DISTURBANCE: ICD-10-CM

## 2022-01-19 DIAGNOSIS — Z51.5 PALLIATIVE CARE ENCOUNTER: Primary | ICD-10-CM

## 2022-01-19 DIAGNOSIS — I10 HYPERTENSION, UNSPECIFIED TYPE: ICD-10-CM

## 2022-01-19 DIAGNOSIS — R41.0 CONFUSION: ICD-10-CM

## 2022-01-19 DIAGNOSIS — R23.2 FLUSH: ICD-10-CM

## 2022-01-19 DIAGNOSIS — F02.818 LATE ONSET ALZHEIMER'S DISEASE WITH BEHAVIORAL DISTURBANCE: ICD-10-CM

## 2022-01-19 DIAGNOSIS — J98.8 CONGESTION OF UPPER AIRWAY: ICD-10-CM

## 2022-01-19 DIAGNOSIS — Z71.89 GOALS OF CARE, COUNSELING/DISCUSSION: ICD-10-CM

## 2022-01-19 PROCEDURE — 99499 RISK ADDL DX/OHS AUDIT: ICD-10-PCS | Mod: S$GLB,,, | Performed by: NURSE PRACTITIONER

## 2022-01-19 PROCEDURE — 99350 PR HOME VISIT,ESTAB PATIENT,LEVEL IV: ICD-10-PCS | Mod: S$GLB,,, | Performed by: NURSE PRACTITIONER

## 2022-01-19 PROCEDURE — 99499 UNLISTED E&M SERVICE: CPT | Mod: S$GLB,,, | Performed by: NURSE PRACTITIONER

## 2022-01-19 PROCEDURE — 99350 HOME/RES VST EST HIGH MDM 60: CPT | Mod: S$GLB,,, | Performed by: NURSE PRACTITIONER

## 2022-01-19 NOTE — PROGRESS NOTES
[facility]  Palliative Care Home Visit    Visit Date: 1/19/2022  Encounter Provider:  Jayda Self DNP, FNP-c  PCP:  Annette Ruiz MD    Subjective:      Patient ID: Kayla Sanchez is a 80 y.o. female.    Consult Requested By:  Annette Ruiz M.D.  Reason for Consult:  Palliative Care    Chief Complaint: Palliative Care follow-up visit      Outpatient Palliative Care Encounter:    Patient is being seen at home due to physical debility that presents a taxing effort to leave the home, to mitigate high risk of hospital readmission and/or due to the limited availability of reliable or safe options for transportation to the point of access to the provider. Prior to treatment on this visit the chart was reviewed and patient/family verbal consent was obtained.      PMHx:    Ms. 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       PSHx:  Past surgical history includes adenoidectomy, appendectomy, cataract extraction, cholecystectomy, corneal transplant, bilateral eye surgery, thyroid surgery, and tonsillectomy.     Social History:    Patient was  to her  [place] (unsure how many  Years). Patient has 2 biological adult children and 2 adult step children (4 daughters), all living. Patient has 1 brother who lives out of state. 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 Patient's  served in the [place]  during [place]II (??). Patient is unsure of which War and what branch. Lives  at home with her step daughter and she she has a private sitter M-F during.       Patient likes to watch t.v., eat, read, travel, and liked puzzles    Impression:    Ms. Sanchez was seen today@home for a palliative care follow-up visit. She is sitting on sofa AA&Ox 1 self only. She appears flush and sleepy. Minimally verbal. Sitter Marlene is present.     Sitter Marlene has recently started with congestion  Denies fever, chills, sore throat  No c/o headaches or SOB or chest pain    +watery eyes    +cough productive>>>>    +sneezing started Friday   Symptoms started on Wednesday and Thursday. Her sitter with her today, reports that the sitter she had last week was exposed to someone with COVID.     Symptomatic treatment with tylenol.     Prior to the onset of these symptoms, she had been stable and at her baseline. She has been able to ambulate on her own with some assistance. She has not had any recent falls.   No c/o body aches but recently she couldn't walk. Has chest congestion with runny nose    Note:  Spoke to patient's daughter Ailyn and explained to her patient is displaying symptoms of Covid virus and NP recommends she have Ms. Garza tested for covid. States she is unsure if she will bring her to get tested.    Goals of Care:  I initiated the process of advance care planning today and explained the importance of this process to the patient and family.  I introduced the concept of advance direcddtives to the patient, as well. Then the patient received detailed information about the importance of designating a Health Care Power of  (HCPOA). She was also instructed to communicate with this person about her wishes for future healthcare, should she become sick and lose decision-making capacity.    I Introduced LaPOST form with patient/family, explaining this is the patient's wishes, and this form will be uploaded into [facility] and the [place] Registry.       1/19 Discussed goals of care with  Ms. Garza and adi would like her to be comfortable at this time    PLAN:    1. Palliative care NP to follow-up in 6-8 weeks on 4/20/2022@home visit only  2. Continue all medications  3  Not to be left alone  4. F/u with PCP as needed  5. Left LaPOST at home for daughter to complete  6. In emergency, call 911 or go to ED; notify PCP's office  7. Recommend pt should have Covid test done  8. Recommend increasing po fluids I.e. Gatorade and Pedialyte   9. Tylenol prn pain/fever as needed    Review of Systems   Unable to perform ROS: Dementia       Assessments:  · Environmental: single story home, clean, uncluttered, good lighting  · Functional Status: bathes and dresses self; feeds herself  · Safety:  Fall and Covid 19 precautions  · Nutritional: eats 3 meals a day; snacks throughout the day; loves chocolate milk  · Home Health/DME/Supplies: none    Symptom Assessment (ESAS 0-10 scale)     ESAS 0 1 2 3 4 5 6 7 8 9 10   Pain x             Dyspnea x             Anxiety   x           Nausea x             Depression  x             Anorexia x             Fatigue      x        Insomnia        x      Restlessness            x   Agitation       x         Constipation    no  Bowel Management Plan (BMP): no  Diarrhea        no  Comments: LBM 1/19 normal    Performance Status:   PPS Score:  50%  Karnofsky Score:  50%  FAST: 6c      History:  Past Medical History:   Diagnosis Date    Arthritis     Diabetes mellitus     Diabetes mellitus type II     GERD (gastroesophageal reflux disease)     Glaucoma     History of migraine headaches     Hyperlipidemia     Hypertension     Hypothyroidism     Late onset Alzheimer's disease with behavioral disturbance 9/2/2020    Neuropathy due to type 2 diabetes mellitus     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    Type 2 diabetes mellitus with renal manifestations 1/20/2016     Family History   Problem Relation Age of Onset    Cancer Mother     Thyroid disease Mother      Diabetes Father     Heart disease Father     COPD Brother     Migraines Daughter     Thyroid disease Maternal Aunt     Blindness Neg Hx     Cataracts Neg Hx     Glaucoma Neg Hx     Hypertension Neg Hx     Stroke Neg Hx     Amblyopia Neg Hx     Melanoma Neg Hx      Past Surgical History:   Procedure Laterality Date    ADENOIDECTOMY      APPENDECTOMY      CATARACT EXTRACTION      CHOLECYSTECTOMY      CORNEAL TRANSPLANT      DSEK OU      EYE SURGERY Bilateral     corneal transplant    THYROID SURGERY      goiter removal    TONSILLECTOMY       Review of patient's allergies indicates:  No Known Allergies  Medications:    Current Outpatient Medications:     mupirocin (BACTROBAN) 2 % ointment, Apply to affected area 2 times daily, Disp: 22 g, Rfl: 1    QUEtiapine (SEROQUEL) 25 MG Tab, Take 1 tablet (25 mg total) by mouth once daily., Disp: 30 tablet, Rfl: 11    24h Oral Morphine Equivalents (OME):  n/a    Objective:     Physical Exam:  Vitals:   1/19/2022    SpO2: 95% on room air   PainSc:   0   PainLoc:      There is no height or weight on file to calculate BMI.    Physical Exam  Vitals and nursing note reviewed. Exam conducted with a chaperone present.   Constitutional:       Appearance: Normal appearance. She is ill-appearing.      Comments: Appears flush and red facial cheeks   HENT:      Head: Normocephalic and atraumatic.      Nose: Congestion and rhinorrhea present.      Mouth/Throat:      Mouth: Mucous membranes are moist.      Pharynx: Oropharynx is clear.   Eyes:      Extraocular Movements: Extraocular movements intact.      Conjunctiva/sclera: Conjunctivae normal.      Comments: +watery eyes   Cardiovascular:      Rate and Rhythm: Normal rate and regular rhythm.      Pulses: Normal pulses.      Heart sounds: Murmur (grade II/VI) heard.     No gallop.   Pulmonary:      Effort: Pulmonary effort is normal. No respiratory distress.      Breath sounds: Rhonchi present.      Comments: BBS  Coarse to all lung bases  Abdominal:      General: Bowel sounds are normal. There is no distension.      Palpations: Abdomen is soft.      Tenderness: There is no abdominal tenderness.   Genitourinary:     Comments: Incontinent of bowel and bladder  Musculoskeletal:         General: Normal range of motion.      Cervical back: Normal range of motion and neck supple. No rigidity.   Skin:     General: Skin is warm and dry.      Capillary Refill: Capillary refill takes 2 to 3 seconds.      Coloration: Skin is pale.   Neurological:      Mental Status: She is alert. She is disoriented.      Motor: Weakness present.      Gait: Gait abnormal.      Comments: Oriented x 1 self only; confused and at times hallucinates   Psychiatric:         Mood and Affect: Mood normal.         Behavior: Behavior normal.      Comments: confused         Labs:  CBC:   WBC   Date Value Ref Range Status   10/05/2020 9.95 3.90 - 12.70 K/uL Final     MCV   Date Value Ref Range Status   10/05/2020 89 82 - 98 fL Final            Hematocrit   Date Value Ref Range Status   10/05/2020 44.2 37.0 - 48.5 % Final                       Albumin:   Albumin   Date Value Ref Range Status   10/05/2020 3.5 3.5 - 5.2 g/dL Final     Protein:   Total Protein   Date Value Ref Range Status   10/05/2020 7.5 6.0 - 8.4 g/dL Final       Radiology:  I have reviewed all pertinent imaging results/findings within the past 24 hours.    Psychosocial/Cultural/Spiritual:   · F- Tammy and Belief:  Jehovah's witness   · I - Importance: no  · C - Community:  [facility]  · A - Address in Care:  No spiritual needs identified      Assessment:     1. Palliative care encounter  2. Goals of care discussion  3. Confusion  4. Late onset of Alzheimer's disease with behavioral disturbance  5. Hypertension, unspecified  6. Congestion upper airway  7. Flush      Plan:     Ethical / Legal: Advance Care Planning   · Surrogate decision maker:  Name: Ailyn Lane Relationship: step daughter  POA 8/2/2017  · Code Status: DNR  · LaPOST:  Completed on 9/2017  · Other advance directive:  yes,   · Capacity to make medical decisions:  none,   · Conflict: none       Assessment and Plan:    Palliative care encounter  -palliative care referral placed  -initiate palliative care services today  -Reviewed LaPOST Form & left form at patient's home   -LaPOST Form completed on 9/16/2017  -Patient/family want to continue with Palliative care services    Counseling regarding advance care planning and goals of care   -pt would like to be able to remember more  -7/7 daughter would like her mother to be comfortable being at home  -10/5 wants to be comfortable  -11/30 Discussed goals of care with sitter/daughter/patient and would like to be comfortable  -1/19 Discussed goals of care with sitter and would like her to feel better and be comfortable    Were controlled substances prescribed?  No    Total clinical care time was 60min. Reviewed Chart and PCP's notes thenThe following issues were discussed: medications, diet, Alzheimer's disease, confusion, falls, chest congestion, hypertension, flush, Covid symptoms; goals of care discussion    Follow Up Appointments:     1. F/u with Palliative care NP on 4/20/2022@home visit only    Patient and family agreed via verbal consent to access medical records and for assessment and treatment during this visit.    Attestation: Screening criteria to assess the level of the patient's risk for infection with COVID-19 as recommended by the CDC at the time of the above documented home visit concluded appropriateness to proceed.     Universal precautions were maintained at all times, including provider use of >60% alcohol gel hand  immediately prior to entry and upon departing the patient's home as well as cleaning of equipment used in home visit with antibacterial/germicidal disposable wipes.    Patient has not been exposed to anyone with the virus (to the patient's  knowledge)  Patient has not been out of the country in the last 14 days.    NP wore face mask entire length of visit    Jayda Self DNP, FNP-C  [email or web address]  [phone]

## 2022-02-16 ENCOUNTER — CARE AT HOME (OUTPATIENT)
Dept: HOME HEALTH SERVICES | Facility: CLINIC | Age: 82
End: 2022-02-16
Payer: MEDICARE

## 2022-02-16 DIAGNOSIS — Z71.89 GOALS OF CARE, COUNSELING/DISCUSSION: ICD-10-CM

## 2022-02-16 DIAGNOSIS — R41.0 CONFUSION: ICD-10-CM

## 2022-02-16 DIAGNOSIS — Z51.5 PALLIATIVE CARE ENCOUNTER: Primary | ICD-10-CM

## 2022-02-16 DIAGNOSIS — G30.1 LATE ONSET ALZHEIMER'S DISEASE WITH BEHAVIORAL DISTURBANCE: ICD-10-CM

## 2022-02-16 DIAGNOSIS — F02.818 LATE ONSET ALZHEIMER'S DISEASE WITH BEHAVIORAL DISTURBANCE: ICD-10-CM

## 2022-02-16 PROCEDURE — 99350 PR HOME VISIT,ESTAB PATIENT,LEVEL IV: ICD-10-PCS | Mod: S$GLB,,, | Performed by: NURSE PRACTITIONER

## 2022-02-16 PROCEDURE — 99350 HOME/RES VST EST HIGH MDM 60: CPT | Mod: S$GLB,,, | Performed by: NURSE PRACTITIONER

## 2022-02-16 PROCEDURE — 99499 RISK ADDL DX/OHS AUDIT: ICD-10-PCS | Mod: S$GLB,,, | Performed by: NURSE PRACTITIONER

## 2022-02-16 PROCEDURE — 99499 UNLISTED E&M SERVICE: CPT | Mod: S$GLB,,, | Performed by: NURSE PRACTITIONER

## 2022-02-16 NOTE — PROGRESS NOTES
[facility]  Palliative Care Home Visit    Visit Date: 2/16/2022  Encounter Provider:  Jayda Self DNP, FNP-c  PCP:  Annette Ruiz MD    Subjective:      Patient ID: Kayla Sanchez is a 80 y.o. female.    Consult Requested By:  Annette Ruiz M.D.  Reason for Consult:  Palliative Care    Chief Complaint: Palliative Care follow-up visit      Outpatient Palliative Care Encounter:    Patient is being seen at home due to physical debility that presents a taxing effort to leave the home, to mitigate high risk of hospital readmission and/or due to the limited availability of reliable or safe options for transportation to the point of access to the provider. Prior to treatment on this visit the chart was reviewed and patient/family verbal consent was obtained.      PMHx:    Ms. 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       PSHx:  Past surgical history includes adenoidectomy, appendectomy, cataract extraction, cholecystectomy, corneal transplant, bilateral eye surgery, thyroid surgery, and tonsillectomy.     Social History:    Patient was  to her  Casey (unsure how many  Years). Patient has 2 biological adult children and 2 adult step children (4 daughters), all living. Patient has 1 brother who lives out of state. 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 Patient's  served in the [place]  during WWII (??). Patient is unsure of which War and what branch. Lives  at home with her step daughter and she she has a private sitter M-F during.     Patient likes to watch t.v., eat, read, travel, and liked puzzles    Impression:    Ms. Sanchez was seen today@home for a palliative care follow-up visit. Sitter Marlene is present. Discussed getting the COVID-19 booster with the daughter and at this time, we will wait on this.     Marlene reports that Ms. Sanchez has been sleeping more. Objectively, it appears that the patient has lost some weight and is tired. The patient is sleepy today and drifts in and out of sleep.    Ms. Sanchez denies pain at this time. The pt reports having SOB when walking, but Marlene reports that the patient does not get SOB even with exertion. Marlene reports that the patient does not cough, but get sniffles occasionally in the mornings.     Marlene reports that last week on 2/7/2022 and 2/8/2022, the patient had epistaxis with significant bleeding; Marlene reports that it was bleeding nonstop all day. Educated the pt with epistaxis care; prophylactic to use Ayrs for saline to keep the nares from being too dry which could lead to bleeding. If excessive bleeding that won't stop for hours, to use Afrin soaked cotton ball in the nostril that is bleeding.    She is eating 2-3 meals a day (eats 2 with Marlene during the day before Marlene goes home in the evening. Today, she was eating a bowl of spaghetti.)    Marlene reports that the patient occasionally urinates and defecates in the trash can in the kitchen. Marlene also reports that last week, the pt had some constipation and the patient manually pushes feces from her rectum with her hand and keeps the feces in her hand, being confused and sometimes wipes her hands with the feces or sometimes appropriately throws it away. Marlene reports that the pt had diarrhea yesterday.      Goals of Care:  I initiated the process of advance care planning today and explained the importance of this process to the patient and family.  I introduced the concept of  advance direcddtives to the patient, as well. Then the patient received detailed information about the importance of designating a Health Care Power of  (HCPOA). She was also instructed to communicate with this person about her wishes for future healthcare, should she become sick and lose decision-making capacity.    I Introduced LaPOST form with patient/family, explaining this is the patient's wishes, and this form will be uploaded into the patient's [facility] and the [place] [facility].       2/16 Discussed goals of care with Ms. Garza and purvi and would like to keep her comfortable    PLAN:    1. Palliative care NP to follow-up in 6-8 weeks on 5/4/2022@home visit only  2. Continue all medications  3  Not to be left alone  4. F/u with PCP as needed  5. Left LaPOST at home for daughter to complete  6. In emergency, call 911 or go to ED; notify PCP's office  7. Monitor blood pressure daily and keep log      Review of Systems   Unable to perform ROS: Dementia   Constitutional: Negative for chills and fever.   HENT: Positive for nosebleeds (occasional nose bleed). Negative for congestion.    Cardiovascular: Negative for chest pain.   Gastrointestinal: Positive for constipation and diarrhea. Negative for abdominal distention.   Genitourinary: Negative for difficulty urinating.   Musculoskeletal: Positive for gait problem and joint swelling.        Lateral ankle swelling   Skin:        Bruising on the Left forearm, Left long finger, R dorsal hand    Hematological: Bruises/bleeds easily.   Psychiatric/Behavioral: Positive for confusion and sleep disturbance.       Assessments:  · Environmental: single story home, clean, uncluttered, good lighting  · Functional Status: bathes and dresses self; feeds herself  · Safety:  Fall and Covid 19 precautions  · Nutritional: eats 3 meals a day; snacks throughout the day; loves chocolate milk  · Home Health/DME/Supplies: none    Symptom Assessment (ESAS 0-10 scale)      ESAS 0 1 2 3 4 5 6 7 8 9 10   Pain x             Dyspnea x             Anxiety   x           Nausea x             Depression  x             Anorexia x             Fatigue      x        Insomnia        x      Restlessness            x   Agitation       x         Constipation    no  Bowel Management Plan (BMP): no  Diarrhea        no  Comments: LBM  normal    Performance Status:   PPS Score:  50%  Karnofsky Score:  50%  FAST: 6c      History:  Past Medical History:   Diagnosis Date    Arthritis     Diabetes mellitus     Diabetes mellitus type II     GERD (gastroesophageal reflux disease)     Glaucoma     History of migraine headaches     Hyperlipidemia     Hypertension     Hypothyroidism     Late onset Alzheimer's disease with behavioral disturbance 9/2/2020    Neuropathy due to type 2 diabetes mellitus     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    Type 2 diabetes mellitus with renal manifestations 1/20/2016     Family History   Problem Relation Age of Onset    Cancer Mother     Thyroid disease Mother     Diabetes Father     Heart disease Father     COPD Brother     Migraines Daughter     Thyroid disease Maternal Aunt     Blindness Neg Hx     Cataracts Neg Hx     Glaucoma Neg Hx     Hypertension Neg Hx     Stroke Neg Hx     Amblyopia Neg Hx     Melanoma Neg Hx      Past Surgical History:   Procedure Laterality Date    ADENOIDECTOMY      APPENDECTOMY      CATARACT EXTRACTION      CHOLECYSTECTOMY      CORNEAL TRANSPLANT      DSEK OU      EYE SURGERY Bilateral     corneal transplant    THYROID SURGERY      goiter removal    TONSILLECTOMY       Review of patient's allergies indicates:  No Known Allergies  Medications:    Current Outpatient Medications:     mupirocin (BACTROBAN) 2 % ointment, Apply to affected area 2 times daily, Disp: 22 g, Rfl: 1    QUEtiapine (SEROQUEL) 25 MG Tab, Take 1 tablet (25 mg total) by mouth once daily., Disp: 30 tablet, Rfl: 11    24h Oral Morphine  Equivalents (OME):  n/a    Objective:     Physical Exam:  Vitals:   7/7/2021    SpO2: 95% on room air   PainSc:   0   PainLoc:      There is no height or weight on file to calculate BMI.    Physical Exam  Vitals and nursing note reviewed. Exam conducted with a chaperone present.   Constitutional:       Appearance: Normal appearance.   HENT:      Head: Normocephalic and atraumatic.      Nose: Nose normal. No rhinorrhea.      Mouth/Throat:      Mouth: Mucous membranes are moist.      Pharynx: Oropharynx is clear.   Eyes:      Extraocular Movements: Extraocular movements intact.      Conjunctiva/sclera: Conjunctivae normal.   Cardiovascular:      Rate and Rhythm: Normal rate and regular rhythm.      Pulses: Normal pulses.      Heart sounds: Murmur (grade II/VI) heard.     No gallop.   Pulmonary:      Effort: Pulmonary effort is normal. No respiratory distress.      Breath sounds: Normal breath sounds.      Comments: BBS CTA; denies cough  Abdominal:      General: Bowel sounds are normal. There is no distension.      Palpations: Abdomen is soft.      Tenderness: There is no abdominal tenderness.   Genitourinary:     Comments: Continent of bowel and bladder with occasional incontinence of bladder  Musculoskeletal:         General: Swelling present. Normal range of motion.      Cervical back: Normal range of motion and neck supple. No rigidity.      Right lower leg: Edema (trace edema to BLE) present.      Left lower leg: Edema (trace edema to BLE) present.   Skin:     General: Skin is warm and dry.      Capillary Refill: Capillary refill takes 2 to 3 seconds.      Coloration: Skin is not pale.      Findings: Bruising present.      Comments: Bruising along Left forearm and Left third finger. Bruising on the dorsum of the Right hand   Neurological:      Mental Status: She is alert. She is disoriented.      Gait: Gait abnormal.      Comments: Oriented x 1 self only; confused and at times hallucinates   Psychiatric:       Comments: Confused, tangential thoughts         Labs:  CBC:   WBC   Date Value Ref Range Status   10/05/2020 9.95 3.90 - 12.70 K/uL Final     MCV   Date Value Ref Range Status   10/05/2020 89 82 - 98 fL Final            Hematocrit   Date Value Ref Range Status   10/05/2020 44.2 37.0 - 48.5 % Final                       Albumin:   Albumin   Date Value Ref Range Status   10/05/2020 3.5 3.5 - 5.2 g/dL Final     Protein:   Total Protein   Date Value Ref Range Status   10/05/2020 7.5 6.0 - 8.4 g/dL Final       Radiology:  I have reviewed all pertinent imaging results/findings within the past 24 hours.    Psychosocial/Cultural/Spiritual:   · F- Tammy and Belief:  Hinduism   · I - Importance: no  · C - Community:  [facility]  · A - Address in Care:  No spiritual needs identified      Assessment:     1. Palliative care encounter  2. Goals of care discussion  3. Confusion  4. Late onset of Alzheimer's disease with behavioral disturbance  5. Bleeding from nose      Plan:     Ethical / Legal: Advance Care Planning   · Surrogate decision maker:  Name: Ailyn Lane Relationship: step daughter POA 8/2/2017  · Code Status: DNR  · LaPOST:  Completed on 9/2017  · Other advance directive:  yes,   · Capacity to make medical decisions:  none,   · Conflict: none       Assessment and Plan:    Palliative care encounter  -palliative care referral placed  -initiate palliative care services today  -Reviewed LaPOST Form & left form at patient's home   -LaPOST Form completed on 9/16/2017  -Patient/family want to continue with Palliative care services    Counseling regarding advance care planning and goals of care   -pt would like to be able to remember more  -7/7 daughter would like her mother to be comfortable being at home  -10/5 wants to be comfortable  -11/30 Discussed goals of care with sitter/daughter/patient and would like to be comfortable  -2/16 would like to be comfortable    Were controlled substances prescribed?   No    Total clinical care time was 60min. Reviewed Chart and PCP's notes thenThe following issues were discussed: medications, diet, Alzheimer's disease, confusion, falls ; discussed goals of care      Follow Up Appointments:     1. F/u with Palliative care NP on 5/4/2022@home visit only    Patient and family agreed via verbal consent to access medical records and for assessment and treatment during this visit.    Attestation: Screening criteria to assess the level of the patient's risk for infection with COVID-19 as recommended by the CDC at the time of the above documented home visit concluded appropriateness to proceed.     Universal precautions were maintained at all times, including provider use of >60% alcohol gel hand  immediately prior to entry and upon departing the patient's home as well as cleaning of equipment used in home visit with antibacterial/germicidal disposable wipes.    Patient has not been exposed to anyone with the virus (to the patient's knowledge)  Patient has not been out of the country in the last 14 days.    NP wore face mask entire length of visit    Jayda Self DNP, FNP-C  [facility]  [phone]

## 2022-03-25 ENCOUNTER — TELEPHONE (OUTPATIENT)
Dept: PRIMARY CARE CLINIC | Facility: CLINIC | Age: 82
End: 2022-03-25
Payer: MEDICARE

## 2022-03-25 NOTE — TELEPHONE ENCOUNTER
Call pt to confirmed apt on 3/28/22 at 11 pt informed me she will have to cancel apt because she forgot about it pt VERBALLY  Cancel her apt

## 2022-04-08 ENCOUNTER — TELEPHONE (OUTPATIENT)
Dept: PRIMARY CARE CLINIC | Facility: CLINIC | Age: 82
End: 2022-04-08
Payer: MEDICARE

## 2022-04-08 DIAGNOSIS — Z02.2 ENCOUNTER FOR EXAMINATION FOR ADMISSION TO NURSING HOME: Primary | ICD-10-CM

## 2022-04-08 NOTE — TELEPHONE ENCOUNTER
Spoke with Ailyn, pt will need a TB test pt will be going to a nursing home    [facility], phone [phone]. Had to leave message for call back .

## 2022-04-08 NOTE — TELEPHONE ENCOUNTER
----- Message from Milagro Ramirez MA sent at 4/8/2022 12:52 PM CDT -----  Contact: Daughter/ Ailyn [phone]  PLEASE ADVISE   ----- Message -----  From: Asher Lassiter  Sent: 4/8/2022  11:19 AM CDT  To: [facility] Staff    The patient needs to be assessed for a nursing home and get a TB test. She said the nursing home faxed forms for this sometime this week.    Thank you

## 2022-04-11 ENCOUNTER — TELEPHONE (OUTPATIENT)
Dept: PRIMARY CARE CLINIC | Facility: CLINIC | Age: 82
End: 2022-04-11
Payer: MEDICARE

## 2022-04-11 NOTE — TELEPHONE ENCOUNTER
Called Mary, [phone], left message for call back,   Explained that pt dtr is calling to see what needs to be done to get her mom in nsg home.     Left fax number also.

## 2022-04-11 NOTE — TELEPHONE ENCOUNTER
Mary/[facility] called, stated that pt onl y needs CXR to be admitted and also paper work.     Mary stated that she faxed paper work already, explained I could not find paper work, she stated she would resend.    Once completed and pt has  CXR, pt will be admitted to their services.

## 2022-04-11 NOTE — TELEPHONE ENCOUNTER
Called Ailyn, explained that pt only needs CXR and paper work. Explained pt needs to come for CXR, if portable, that can be arranged and may incur a cost.    Explained that Mary/[facility] would email the required paper work and will have Dr. Ruiz complete.    Explained once these 2 things are completed, pt would be able to be admitted to [facility].     Left number for call back.

## 2022-04-11 NOTE — PATIENT INSTRUCTIONS
FOLLOW-UP INSTRUCTIONS:    Follow-up with palliative care NP in 4-6 weeks on 4/20/2022@home visit  Continue all medications, treatments, and therapies as ordered  Fall precautions at all times  Maintain Safety precautions at all times  Attend all medical appointments as scheduled  F/u with PCP as needed  Patient to have 6 feet between each other  In an Emergency, call 911 or go to ED; notify PCP's office  9. Limit Risks of environmental exposure to coronavirus as discussed including: social distancing, hand hygiene, and limiting departures from the home for necessities only.   10. Patient not to be left alone  11. Recommend dtr brings pt to have Covid test done  12. Recommend Tylenol prn fever/body aches prn  13. Recommend increase po fluids Gatorade and Pedialyte

## 2022-04-12 ENCOUNTER — TELEPHONE (OUTPATIENT)
Dept: PRIMARY CARE CLINIC | Facility: CLINIC | Age: 82
End: 2022-04-12
Payer: MEDICARE

## 2022-04-12 ENCOUNTER — HOSPITAL ENCOUNTER (OUTPATIENT)
Dept: RADIOLOGY | Facility: HOSPITAL | Age: 82
Discharge: HOME | End: 2022-04-12
Attending: INTERNAL MEDICINE
Payer: MEDICARE

## 2022-04-12 DIAGNOSIS — Z02.2 ENCOUNTER FOR EXAMINATION FOR ADMISSION TO NURSING HOME: ICD-10-CM

## 2022-04-12 DIAGNOSIS — F02.818 LATE ONSET ALZHEIMER'S DISEASE WITH BEHAVIORAL DISTURBANCE: ICD-10-CM

## 2022-04-12 DIAGNOSIS — G30.1 LATE ONSET ALZHEIMER'S DISEASE WITH BEHAVIORAL DISTURBANCE: ICD-10-CM

## 2022-04-12 DIAGNOSIS — Z02.2 ENCOUNTER FOR EXAMINATION FOR ADMISSION TO NURSING HOME: Primary | ICD-10-CM

## 2022-04-12 PROCEDURE — 71046 XR CHEST PA AND LATERAL: ICD-10-PCS | Mod: 26,,, | Performed by: RADIOLOGY

## 2022-04-12 PROCEDURE — 71046 X-RAY EXAM CHEST 2 VIEWS: CPT | Mod: TC

## 2022-04-12 PROCEDURE — 71046 X-RAY EXAM CHEST 2 VIEWS: CPT | Mod: 26,,, | Performed by: RADIOLOGY

## 2022-04-12 NOTE — TELEPHONE ENCOUNTER
OP[facility] referral placed to complete [facility]'s memory care application. This is a case management task. Once the clinical portions are complete, please bring to me for review and signatures.    Paperwork given to [place] by Verito.    Thanks,  KJ

## 2022-04-13 NOTE — TELEPHONE ENCOUNTER
Verito is correcting errors in paperwork:  - patient is DNR not FULL CODE. All ACP documents printed to provide supportive documentation  - vital signs and physical exam  - full problem list    Paperwork signed for long term care admission.    Thanks,  KJ

## 2022-04-24 ENCOUNTER — TELEPHONE (OUTPATIENT)
Dept: HOME HEALTH SERVICES | Facility: CLINIC | Age: 82
End: 2022-04-24
Payer: MEDICARE

## 2022-04-24 NOTE — TELEPHONE ENCOUNTER
4/19/2022 @7:12pm    Patient on Palliative Care NP's schedule tomorrow. Called patient to setup time for appointment. There was no answer so left message on voicemail for return call.    Never received return call from daughter      Message Sent to  Irlanda Denise to reschedule patient in next 2 weeks.    Jayda Self DNP, FNP-C  [email or web address]  [phone]

## 2022-05-04 ENCOUNTER — CARE AT HOME (OUTPATIENT)
Dept: HOME HEALTH SERVICES | Facility: CLINIC | Age: 82
End: 2022-05-04
Payer: MEDICARE

## 2022-05-04 DIAGNOSIS — K59.00 CONSTIPATION, UNSPECIFIED CONSTIPATION TYPE: ICD-10-CM

## 2022-05-04 DIAGNOSIS — T14.8XXA BRUISING: ICD-10-CM

## 2022-05-04 DIAGNOSIS — R41.0 CONFUSION: ICD-10-CM

## 2022-05-04 DIAGNOSIS — F02.818 LATE ONSET ALZHEIMER'S DISEASE WITH BEHAVIORAL DISTURBANCE: ICD-10-CM

## 2022-05-04 DIAGNOSIS — G30.1 LATE ONSET ALZHEIMER'S DISEASE WITH BEHAVIORAL DISTURBANCE: ICD-10-CM

## 2022-05-04 DIAGNOSIS — Z51.5 PALLIATIVE CARE ENCOUNTER: Primary | ICD-10-CM

## 2022-05-04 DIAGNOSIS — Z71.89 GOALS OF CARE, COUNSELING/DISCUSSION: ICD-10-CM

## 2022-05-04 PROCEDURE — 99350 HOME/RES VST EST HIGH MDM 60: CPT | Mod: S$GLB,,, | Performed by: NURSE PRACTITIONER

## 2022-05-04 PROCEDURE — 99350 PR HOME VISIT,ESTAB PATIENT,LEVEL IV: ICD-10-PCS | Mod: S$GLB,,, | Performed by: NURSE PRACTITIONER

## 2022-05-04 NOTE — PATIENT INSTRUCTIONS
FOLLOW-UP INSTRUCTIONS:    Follow-up with palliative care NP in 4-6 weeks on 6/16/2022@home visit  Continue all medications, treatments, and therapies as ordered  Fall precautions at all times  Maintain Safety precautions at all times  Attend all medical appointments as scheduled  F/u with PCP as needed  Patient to have 6 feet between each other  In an Emergency, call 911 or go to ED; notify PCP's office  9. Limit Risks of environmental exposure to coronavirus as discussed including: social distancing, hand hygiene, and limiting departures from the home for necessities only.   10. Patient not to be left alone  11. Increase fiber in diet  12. Keep BLE elevated to reduce edema

## 2022-05-26 ENCOUNTER — OUTPATIENT CASE MANAGEMENT (OUTPATIENT)
Dept: ADMINISTRATIVE | Facility: OTHER | Age: 82
End: 2022-05-26
Payer: MEDICARE

## 2022-05-29 VITALS
SYSTOLIC BLOOD PRESSURE: 140 MMHG | DIASTOLIC BLOOD PRESSURE: 70 MMHG | RESPIRATION RATE: 18 BRPM | OXYGEN SATURATION: 99 % | TEMPERATURE: 98 F | HEART RATE: 69 BPM

## 2022-05-29 NOTE — PROGRESS NOTES
[facility]  Palliative Care Home Visit    Visit Date: 5/4/2022  Encounter Provider:  Jayda Self DNP, FNP-c  PCP:  Annette Ruiz MD    Subjective:      Patient ID: Kayla Sanchez is a 80 y.o. female.    Consult Requested By:  Annette Ruiz M.D.  Reason for Consult:  Palliative Care    Chief Complaint: Palliative Care follow-up visit      Outpatient Palliative Care Encounter:    Patient is being seen at home due to physical debility that presents a taxing effort to leave the home, to mitigate high risk of hospital readmission and/or due to the limited availability of reliable or safe options for transportation to the point of access to the provider. Prior to treatment on this visit the chart was reviewed and patient/family verbal consent was obtained.      PMHx:    Ms. 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       PSHx:  Past surgical history includes adenoidectomy, appendectomy, cataract extraction, cholecystectomy, corneal transplant, bilateral eye surgery, thyroid surgery, and tonsillectomy.     Social History:    Patient was  to her  Casey (unsure how many  Years). Patient has 2 biological adult children and 2 adult step children (4 daughters), all living. Patient has 1 brother who lives out of state. 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 Patient's  served in the [place]  during WWII (??). Patient is unsure of which War and what branch. Lives  at home with her step daughter and she she has a private sitter M-F during.     Patient likes to watch t.v., eat, read, travel, and liked puzzles    Impression:    Ms. Sanchez was seen today@ [facility]. Patient is in the Memory Care Unit. Prior to visit called and left message for daughter letting her know NP was going out to facility to see Ms. Garza. She Texted message back giving consent for a visit. Patient was sitting in open area watching t.v. She was AA&Ox 1 self only. She speaks in word salad. Daughter Ailyn recently had mother placed in assisted living. C/o pain to left hand with tenderness but she is able to extend and flex hand with no pain. No swelling noted. Right arm with small dark purple bruising. Left FA with dark purple bruising. Photo taken. Caregivers report she is having some constipation. Appetite has been good and eating well. Spoke with nurse (director) and caregivers on unit. Denies fever, chills, sore throat    Goals of Care:  I initiated the process of advance care planning today and explained the importance of this process to the patient and family.  I introduced the concept of advance direcddtives to the patient, as well. Then the patient received detailed information about the importance of designating a Health Care Power of  (HCPOA). She was also instructed to communicate with this person about her wishes for future healthcare, should she become sick and lose decision-making capacity.    I Introduced LaPOST form with patient/family, explaining this is the patient's wishes, and this form will be [facility] and the [place] Registry.       5/4  Discussed goals of care with caregivers and would like to keep her comfortable    PLAN:    1. Palliative care NP to follow-up in 6-8 weeks on 6/16/2022@home visit only  2. Continue all medications  3  Not to be left alone  4. F/u with PCP as needed  5. Left LaPOST at home for daughter to  complete  6. In emergency, call 911 or go to ED; notify PCP's office  7. Facility to call if any problems or falls    Review of Systems   Unable to perform ROS: Dementia   Constitutional: Negative for activity change, chills and fever.   HENT: Positive for rhinorrhea. Negative for congestion and trouble swallowing.    Respiratory: Negative for cough and shortness of breath.    Cardiovascular: Negative for chest pain.   Gastrointestinal: Positive for constipation. Negative for abdominal distention and diarrhea (  ).   Genitourinary: Negative for difficulty urinating.   Musculoskeletal: Positive for arthralgias, gait problem and neck pain.        Lateral ankle swelling   Skin: Negative for wound.        Bruising on the Left forearm, Left long finger, R dorsal hand    Hematological: Bruises/bleeds easily.   Psychiatric/Behavioral: Positive for confusion and sleep disturbance.       Assessments:  · Environmental: single story home, clean, uncluttered, good lighting  · Functional Status: bathes and dresses self; feeds herself  · Safety:  Fall and Covid 19 precautions  · Nutritional: eats 3 meals a day; snacks throughout the day; loves chocolate milk  · Home Health/DME/Supplies: none    Symptom Assessment (ESAS 0-10 scale)     ESAS 0 1 2 3 4 5 6 7 8 9 10   Pain x             Dyspnea x             Anxiety   x           Nausea x             Depression  x             Anorexia x             Fatigue      x        Insomnia        x      Restlessness            x   Agitation       x         Constipation    no  Bowel Management Plan (BMP): no  Diarrhea        no  Comments: LBM 5/3 some constipation    Performance Status:   PPS Score:  50%  Karnofsky Score:  50%  FAST: 6c      History:  Past Medical History:   Diagnosis Date    Arthritis     Diabetes mellitus     Diabetes mellitus type II     GERD (gastroesophageal reflux disease)     Glaucoma     History of migraine headaches     Hyperlipidemia     Hypertension      Hypothyroidism     Late onset Alzheimer's disease with behavioral disturbance 9/2/2020    Neuropathy due to type 2 diabetes mellitus     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    Type 2 diabetes mellitus with renal manifestations 1/20/2016     Family History   Problem Relation Age of Onset    Cancer Mother     Thyroid disease Mother     Diabetes Father     Heart disease Father     COPD Brother     Migraines Daughter     Thyroid disease Maternal Aunt     Blindness Neg Hx     Cataracts Neg Hx     Glaucoma Neg Hx     Hypertension Neg Hx     Stroke Neg Hx     Amblyopia Neg Hx     Melanoma Neg Hx      Past Surgical History:   Procedure Laterality Date    ADENOIDECTOMY      APPENDECTOMY      CATARACT EXTRACTION      CHOLECYSTECTOMY      CORNEAL TRANSPLANT      DSEK OU      EYE SURGERY Bilateral     corneal transplant    THYROID SURGERY      goiter removal    TONSILLECTOMY       Review of patient's allergies indicates:  No Known Allergies  Medications:    Current Outpatient Medications:     mupirocin (BACTROBAN) 2 % ointment, Apply to affected area 2 times daily, Disp: 22 g, Rfl: 1    QUEtiapine (SEROQUEL) 25 MG Tab, Take 1 tablet (25 mg total) by mouth once daily., Disp: 30 tablet, Rfl: 11    24h Oral Morphine Equivalents (OME):  n/a    Objective:     Physical Exam:  Vitals:   5/4/2022    SpO2: 99% on room air   PainSc:   0   PainLoc:      There is no height or weight on file to calculate BMI.    Physical Exam  Vitals and nursing note reviewed. Exam conducted with a chaperone present.   Constitutional:       Appearance: Normal appearance.   HENT:      Head: Normocephalic and atraumatic.      Nose: Nose normal. No rhinorrhea.      Mouth/Throat:      Mouth: Mucous membranes are moist.      Pharynx: Oropharynx is clear.   Eyes:      Extraocular Movements: Extraocular movements intact.      Conjunctiva/sclera: Conjunctivae normal.   Cardiovascular:      Rate and Rhythm: Normal rate and regular  rhythm.      Pulses: Normal pulses.      Heart sounds: Murmur (grade II/VI) heard.     No gallop.   Pulmonary:      Effort: Pulmonary effort is normal. No respiratory distress.      Breath sounds: Normal breath sounds.      Comments: BBS CTA; denies cough  Abdominal:      General: Bowel sounds are normal. There is no distension.      Palpations: Abdomen is soft.      Tenderness: There is no abdominal tenderness.   Genitourinary:     Comments: Continent of bowel and bladder with occasional incontinence of bladder  Musculoskeletal:         General: Swelling present. Normal range of motion.      Cervical back: Normal range of motion and neck supple. No rigidity.      Right lower leg: Edema (trace edema to BLE) present.      Left lower leg: Edema (trace edema to BLE) present.   Skin:     General: Skin is warm and dry.      Capillary Refill: Capillary refill takes 2 to 3 seconds.      Coloration: Skin is not pale.      Findings: Bruising present.      Comments: Bruising noted to right arm small dark purple area and left FA with dark purple bruising   Neurological:      Mental Status: She is alert. She is disoriented.      Gait: Gait abnormal.      Comments: Oriented x 1 self only; confused and at times hallucinates   Psychiatric:         Mood and Affect: Mood normal.         Behavior: Behavior normal.      Comments: Confused, tangential thoughts         Labs:  CBC:   WBC   Date Value Ref Range Status   10/05/2020 9.95 3.90 - 12.70 K/uL Final     MCV   Date Value Ref Range Status   10/05/2020 89 82 - 98 fL Final            Hematocrit   Date Value Ref Range Status   10/05/2020 44.2 37.0 - 48.5 % Final                       Albumin:   Albumin   Date Value Ref Range Status   10/05/2020 3.5 3.5 - 5.2 g/dL Final     Protein:   Total Protein   Date Value Ref Range Status   10/05/2020 7.5 6.0 - 8.4 g/dL Final       Radiology:  I have reviewed all pertinent imaging results/findings within the past 24  hours.    Psychosocial/Cultural/Spiritual:   · F- Tammy and Belief:  Shinto   · I - Importance: no  · C - Community:  [facility]  · A - Address in Care:  No spiritual needs identified      Assessment:     1. Palliative care encounter  2. Goals of care discussion  3. Confusion  4. Late onset of Alzheimer's disease with behavioral disturbance  5. Bruising  6. Constipation, unspecified      Plan:     Ethical / Legal: Advance Care Planning   · Surrogate decision maker:  Name: Ailyn Lane Relationship: step daughter POA 8/2/2017  · Code Status: DNR  · LaPOST:  Completed on 9/2017  · Other advance directive:  yes,   · Capacity to make medical decisions:  none,   · Conflict: none       Assessment and Plan:    Palliative care encounter  -palliative care referral placed  -initiate palliative care services today  -Reviewed LaPOST Form & left form at patient's home   -LaPOST Form completed on 9/16/2017  -Patient/family want to continue with Palliative care services    Counseling regarding advance care planning and goals of care   -pt would like to be able to remember more  -7/7 daughter would like her mother to be comfortable being at home  -10/5 wants to be comfortable  -11/30 Discussed goals of care with sitter/daughter/patient and would like to be comfortable  -5/4 Discussed goals of care with caregivers and would like to keep her pain free and comfortable    Were controlled substances prescribed?  No    Total clinical care time was 60min. Reviewed Chart and PCP's notes thenThe following issues were discussed: medications, diet,  Constipation, bruising, Alzheimer's disease, confusion, falls; discussed goals of care      Follow Up Appointments:     1. F/u with Palliative care NP on 6/16/2022@home visit only    Patient and family agreed via verbal consent to access medical records and for assessment and treatment during this visit.    Attestation: Screening criteria to assess the level of the patient's  risk for infection with COVID-19 as recommended by the CDC at the time of the above documented home visit concluded appropriateness to proceed.     Universal precautions were maintained at all times, including provider use of >60% alcohol gel hand  immediately prior to entry and upon departing the patient's home as well as cleaning of equipment used in home visit with antibacterial/germicidal disposable wipes.    Patient has not been exposed to anyone with the virus (to the patient's knowledge)  Patient has not been out of the country in the last 14 days.    NP wore face mask entire length of visit    Jayda Self DNP, FNP-C  [facility]  [phone]

## 2022-06-22 ENCOUNTER — TELEPHONE (OUTPATIENT)
Dept: HOME HEALTH SERVICES | Facility: CLINIC | Age: 82
End: 2022-06-22
Payer: MEDICARE

## 2022-06-22 NOTE — TELEPHONE ENCOUNTER
6/20/2022    Received call from patient's daughter Ailyn. Returned call and she states she went to see her mother on 6/9 for her BD. She noticed her bilateral lower extremities were significantly swollen and states she has not been walking. She is now wheelchair bound.     She hasn't been walking for the last month. Requests NP go visit mother at [facility]. Patient is already on NP's schedule for 6/23 for visit and will assess patient and follow-up    Jayda Self DNP, FNP-C  [facility]  [phone]

## 2022-06-23 ENCOUNTER — CARE AT HOME (OUTPATIENT)
Dept: HOME HEALTH SERVICES | Facility: CLINIC | Age: 82
End: 2022-06-23
Payer: MEDICARE

## 2022-06-23 DIAGNOSIS — S31.000A SACRAL WOUND, INITIAL ENCOUNTER: ICD-10-CM

## 2022-06-23 DIAGNOSIS — R60.0 EDEMA OF BOTH LOWER EXTREMITIES: ICD-10-CM

## 2022-06-23 DIAGNOSIS — T14.8XXA BRUISING: ICD-10-CM

## 2022-06-23 DIAGNOSIS — R41.0 CONFUSION: ICD-10-CM

## 2022-06-23 DIAGNOSIS — Z71.89 GOALS OF CARE, COUNSELING/DISCUSSION: ICD-10-CM

## 2022-06-23 DIAGNOSIS — G30.1 LATE ONSET ALZHEIMER'S DISEASE WITH BEHAVIORAL DISTURBANCE: ICD-10-CM

## 2022-06-23 DIAGNOSIS — F02.818 LATE ONSET ALZHEIMER'S DISEASE WITH BEHAVIORAL DISTURBANCE: ICD-10-CM

## 2022-06-23 DIAGNOSIS — Z51.5 PALLIATIVE CARE ENCOUNTER: Primary | ICD-10-CM

## 2022-06-23 PROCEDURE — 99350 PR HOME VISIT,ESTAB PATIENT,LEVEL IV: ICD-10-PCS | Mod: S$GLB,,, | Performed by: NURSE PRACTITIONER

## 2022-06-23 PROCEDURE — 99350 HOME/RES VST EST HIGH MDM 60: CPT | Mod: S$GLB,,, | Performed by: NURSE PRACTITIONER

## 2022-06-27 NOTE — PROGRESS NOTES
[facility]  Palliative Care Home Visit    Visit Date: 6/23/2022  Encounter Provider:  Jayda Self DNP, FNP-c  PCP:  Annette Ruiz MD    Subjective:      Patient ID: Kayla Sanchez is a 80 y.o. female.    Consult Requested By:  Annette Ruiz M.D.  Reason for Consult:  Palliative Care    Chief Complaint: Palliative Care follow-up visit      Outpatient Palliative Care Encounter:    Patient is being seen at home due to physical debility that presents a taxing effort to leave the home, to mitigate high risk of hospital readmission and/or due to the limited availability of reliable or safe options for transportation to the point of access to the provider. Prior to treatment on this visit the chart was reviewed and patient/family verbal consent was obtained.      PMHx:    Ms. 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       PSHx:  Past surgical history includes adenoidectomy, appendectomy, cataract extraction, cholecystectomy, corneal transplant, bilateral eye surgery, thyroid surgery, and tonsillectomy.     Social History:    Patient was  to her  [place] (unsure how many  Years). Patient has 2 biological adult children and 2 adult step children (4 daughters), all living. Patient has 1 brother who lives out of state. 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 Patient's  served in the [place]  during WWII (??). Patient is unsure of which War and what branch. Lives  at home with her step daughter and she she has a private sitter M-F during.     Patient likes to watch t.v., eat, read, travel, and liked puzzles    Impression:    Received phone call from patient's daughter Ailyn requesting a visit by NP due to patient no longer walking, BLE are swollen and she is wheelchair bound.    Ms. Sanchez was seen today@ [facility]. Patient is in the Memory Care Unit. Discussed patient with staff members who feel Ms. Garza is not ambulating due to having pain to her legs but daughter does not want her to have pain medications. Ms. Sanchez was sitting in the day area in her wheelchair and she is AA&Ox 1 self only. She is verbal but speaks in word salad. No c/o pain at present. Pt brought to her room at facility and assessment done. She is unable to stand or ambulate. Denies fever, chills, sore throat, SOB. BLE edema 1-2+ pitting and wound to sacral area stage II with slough noted to site. Small amount of drainage.     Appetite has been good; eating well. IBB and LBM 6/22     Called daughter and notified her BLE possibly from low protein and need to increase protein in diet. Also, Notified her will order [facility] for wound care and PT and she is okay with these recommendations. Keep BLE elevated which will probably be impossible due to dementia. Facility does not have promod and daughter will have to supply protein shakes      Goals of Care:  I initiated the process of advance care planning today and explained the importance of this process to the patient and family.  I introduced the concept of advance direcddtives to the patient, as well. Then the patient received detailed information about the importance of designating a Health Care Power of  (HCPOA). She was also instructed to communicate with this person about her wishes for future healthcare, should she become sick and lose decision-making capacity.    I Introduced [place] form with patient/family, explaining this  is the patient's wishes, and this form will be uploaded into the patient's [facility] and the [place] Registry.     6/23 Discussed goals of care with daughter and would like PT for strengthening and wound care    PLAN:    1. Palliative care NP to follow-up in 6-8 weeks on 7/28/2022@home visit only  2. Continue all medications  3  Not to be left alone  4. F/u with PCP as needed  5. In emergency, call 911 or go to ED; notify PCP's office  6. Facility to call if any problems or falls  7. Keep BLE elevated   8. Increase protein in diet>>protein shakes 1-2 day  9. Recommend compression stockings on during the day and off at night  10.Referral placed for [facility] for nurse to do wound care and PT  11.Tylenol 500mg po daily and q 8 hours prn pain  12. Turn pt Q 2 hours to offload sacral wound and buttock    Review of Systems   Unable to perform ROS: Dementia (information provided by staff)   Constitutional: Positive for activity change (wheelchair bound). Negative for appetite change.   Respiratory: Negative for cough and shortness of breath.    Cardiovascular: Negative for chest pain.   Gastrointestinal: Negative for abdominal distention.   Genitourinary:        +IBB   Skin: Positive for wound (wound to sacral with slough).   Psychiatric/Behavioral: Positive for confusion.       Assessments:  · Environmental: single story home, clean, uncluttered, good lighting  · Functional Status: bathes and dresses self; feeds herself  · Safety:  Fall and Covid 19 precautions  · Nutritional: eats 3 meals a day; snacks throughout the day; loves chocolate milk  · Home Health/DME/Supplies: none    Symptom Assessment (ESAS 0-10 scale)     ESAS 0 1 2 3 4 5 6 7 8 9 10   Pain x             Dyspnea x             Anxiety   x           Nausea x             Depression  x             Anorexia x             Fatigue      x        Insomnia        x      Restlessness            x   Agitation       x         Constipation    no  Bowel Management Plan  (BMP): no  Diarrhea        no  Comments: LBM 6/22    Performance Status:   PPS Score:  50%  Karnofsky Score:  50%  FAST: 6c      History:  Past Medical History:   Diagnosis Date    Arthritis     Diabetes mellitus     Diabetes mellitus type II     GERD (gastroesophageal reflux disease)     Glaucoma     History of migraine headaches     Hyperlipidemia     Hypertension     Hypothyroidism     Late onset Alzheimer's disease with behavioral disturbance 9/2/2020    Neuropathy due to type 2 diabetes mellitus     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    Type 2 diabetes mellitus with renal manifestations 1/20/2016     Family History   Problem Relation Age of Onset    Cancer Mother     Thyroid disease Mother     Diabetes Father     Heart disease Father     COPD Brother     Migraines Daughter     Thyroid disease Maternal Aunt     Blindness Neg Hx     Cataracts Neg Hx     Glaucoma Neg Hx     Hypertension Neg Hx     Stroke Neg Hx     Amblyopia Neg Hx     Melanoma Neg Hx      Past Surgical History:   Procedure Laterality Date    ADENOIDECTOMY      APPENDECTOMY      CATARACT EXTRACTION      CHOLECYSTECTOMY      CORNEAL TRANSPLANT      DSEK OU      EYE SURGERY Bilateral     corneal transplant    THYROID SURGERY      goiter removal    TONSILLECTOMY       Review of patient's allergies indicates:  No Known Allergies  Medications:    Current Outpatient Medications:     mupirocin (BACTROBAN) 2 % ointment, Apply to affected area 2 times daily, Disp: 22 g, Rfl: 1    QUEtiapine (SEROQUEL) 25 MG Tab, Take 1 tablet (25 mg total) by mouth once daily., Disp: 30 tablet, Rfl: 11    24h Oral Morphine Equivalents (OME):  n/a    Objective:     Physical Exam:  Vitals:   5/4/2022    SpO2: 99% on room air   PainSc:   0   PainLoc:      There is no height or weight on file to calculate BMI.    Physical Exam  Vitals and nursing note reviewed. Exam conducted with a chaperone present.   Constitutional:        Appearance: Normal appearance.   HENT:      Head: Normocephalic and atraumatic.      Nose: Nose normal. No rhinorrhea.      Mouth/Throat:      Mouth: Mucous membranes are moist.      Pharynx: Oropharynx is clear.   Eyes:      Extraocular Movements: Extraocular movements intact.      Conjunctiva/sclera: Conjunctivae normal.   Cardiovascular:      Rate and Rhythm: Normal rate and regular rhythm.      Pulses: Normal pulses.      Heart sounds: Murmur (grade II/VI) heard.     No gallop.   Pulmonary:      Effort: Pulmonary effort is normal. No respiratory distress.      Breath sounds: Normal breath sounds.      Comments: BBS CTA  Abdominal:      General: Bowel sounds are normal. There is no distension.      Palpations: Abdomen is soft.      Tenderness: There is no abdominal tenderness.   Genitourinary:     Comments: Incontinent of bowel and bladder; wearing pull-ups  Musculoskeletal:         General: Swelling present. Normal range of motion.      Cervical back: Normal range of motion and neck supple. No rigidity.      Right lower leg: Edema (1-2+ pitting edema) present.      Left lower leg: Edema (1-2+pitting edema) present.   Skin:     General: Skin is warm and dry.      Capillary Refill: Capillary refill takes 2 to 3 seconds.      Coloration: Skin is pale.      Findings: Bruising present.      Comments: +left hand skin tear with scab no bleeding; sacral wound with slough noted and small amount of whitish drainage.    Neurological:      Mental Status: She is alert. She is disoriented.      Gait: Gait abnormal.      Comments: Oriented x 1 self only; confused; speaks in word salad   Psychiatric:         Mood and Affect: Mood normal.         Behavior: Behavior normal.      Comments: Confused, tangential thoughts         Labs:  CBC:   WBC   Date Value Ref Range Status   10/05/2020 9.95 3.90 - 12.70 K/uL Final     MCV   Date Value Ref Range Status   10/05/2020 89 82 - 98 fL Final            Hematocrit   Date Value Ref Range  Status   10/05/2020 44.2 37.0 - 48.5 % Final                       Albumin:   Albumin   Date Value Ref Range Status   10/05/2020 3.5 3.5 - 5.2 g/dL Final     Protein:   Total Protein   Date Value Ref Range Status   10/05/2020 7.5 6.0 - 8.4 g/dL Final       Radiology:  I have reviewed all pertinent imaging results/findings within the past 24 hours.    Psychosocial/Cultural/Spiritual:   · F- Tammy and Belief:  Druze   · I - Importance: no  · C - [facility]  [facility]  · A - Address in Care:  No spiritual needs identified      Assessment:     1. Palliative care encounter  2. Goals of care discussion  3. Confusion  4. Late onset of Alzheimer's disease with behavioral disturbance  5. Bruising  6. Edema to both lower extremities  7. Sacral wound, initial encounter      Plan:     Ethical / Legal: Advance Care Planning   · Surrogate decision maker:  Name: Ailyn Lane Relationship: step daughter POA 8/2/2017  · Code Status: DNR  · [place]:  Completed on 9/2017  · Other advance directive:  yes,   · Capacity to make medical decisions:  none,   · Conflict: none       Assessment and Plan:    Palliative care encounter  -palliative care referral placed  -initiate palliative care services today  -Reviewed [place] Form & left form at patient's home   -[place] Form completed on 9/16/2017  -Patient/family want to continue with Palliative care services    Counseling regarding advance care planning and goals of care   -pt would like to be able to remember more  -7/7 daughter would like her mother to be comfortable being at home  -10/5 wants to be comfortable  -11/30 Discussed goals of care with sitter/daughter/patient and would like to be comfortable  -5/4 Discussed goals of care with caregivers and would like to keep her pain free and comfortable  -6/23 Would like PT for strengthening and [facility] for wound care    Were controlled substances prescribed?  No    Total clinical care time was 60min. Reviewed Chart and PCP's  notes thenThe following issues were discussed: medications, diet,, medications, bruising, Alzheimer's disease, confusion, falls, edema of both lower extremities, sacral wound; discussed goals of care      Follow Up Appointments:     1. F/u with Palliative care NP on 7/28/2022@home visit only    Patient and family agreed via verbal consent to access medical records and for assessment and treatment during this visit.    Attestation: Screening criteria to assess the level of the patient's risk for infection with COVID-19 as recommended by the CDC at the time of the above documented home visit concluded appropriateness to proceed.     Universal precautions were maintained at all times, including provider use of >60% alcohol gel hand  immediately prior to entry and upon departing the patient's home as well as cleaning of equipment used in home visit with antibacterial/germicidal disposable wipes.    Patient has not been exposed to anyone with the virus (to the patient's knowledge)  Patient has not been out of the country in the last 14 days.    NP wore face mask entire length of visit    Jayda Self DNP, FNP-C  [facility]  [phone]

## 2022-06-28 VITALS
RESPIRATION RATE: 18 BRPM | OXYGEN SATURATION: 99 % | TEMPERATURE: 98 F | DIASTOLIC BLOOD PRESSURE: 76 MMHG | SYSTOLIC BLOOD PRESSURE: 120 MMHG | HEART RATE: 98 BPM

## 2022-06-29 VITALS
OXYGEN SATURATION: 95 % | TEMPERATURE: 98 F | SYSTOLIC BLOOD PRESSURE: 140 MMHG | RESPIRATION RATE: 18 BRPM | HEART RATE: 70 BPM | DIASTOLIC BLOOD PRESSURE: 70 MMHG

## 2022-06-29 RX ORDER — ACETAMINOPHEN 500 MG
500 TABLET ORAL DAILY
Qty: 60 TABLET | Refills: 3 | Status: SHIPPED | OUTPATIENT
Start: 2022-06-29

## 2022-06-29 NOTE — PATIENT INSTRUCTIONS
FOLLOW-UP INSTRUCTIONS:    Follow-up with palliative care NP in 4-6 weeks on 5/4/2022@home visit  Continue all medications, treatments, and therapies as ordered  Fall precautions at all times  Maintain Safety precautions at all times  Attend all medical appointments as scheduled  F/u with PCP as needed  Patient to have 6 feet between each other  In an Emergency, call 911 or go to ED; notify PCP's office  9. Limit Risks of environmental exposure to coronavirus as discussed including: social distancing, hand hygiene, and limiting departures from the home for necessities only.   10. Patient not to be left alone  11. Elevate BLE   12. Recommend small frequent meals

## 2022-07-12 ENCOUNTER — TELEPHONE (OUTPATIENT)
Dept: HOME HEALTH SERVICES | Facility: CLINIC | Age: 82
End: 2022-07-12
Payer: MEDICARE

## 2022-07-12 DIAGNOSIS — F02.818 LATE ONSET ALZHEIMER'S DISEASE WITH BEHAVIORAL DISTURBANCE: ICD-10-CM

## 2022-07-12 DIAGNOSIS — G30.1 LATE ONSET ALZHEIMER'S DISEASE WITH BEHAVIORAL DISTURBANCE: ICD-10-CM

## 2022-07-12 RX ORDER — QUETIAPINE FUMARATE 25 MG/1
25 TABLET, FILM COATED ORAL DAILY
Qty: 30 TABLET | Refills: 11 | Status: SHIPPED | OUTPATIENT
Start: 2022-07-12 | End: 2023-07-12

## 2022-07-12 RX ORDER — DICLOFENAC SODIUM 10 MG/G
2 GEL TOPICAL 3 TIMES DAILY
Qty: 1 EACH | Refills: 3 | Status: SHIPPED | OUTPATIENT
Start: 2022-07-12

## 2022-07-12 NOTE — TELEPHONE ENCOUNTER
7/11/2022    Received voice message from Irlanda bhatt from [facility]. Trying to reach NP for refills on   Seroquel and Voltaren gel    7/12/2022    Called [facility]. Requested refills on the two above medications. Sent refills to pharmacy     Jayda Self, LAUREN, FNP-C  [facility]  [phone]

## 2022-07-17 ENCOUNTER — TELEPHONE (OUTPATIENT)
Dept: HOME HEALTH SERVICES | Facility: CLINIC | Age: 82
End: 2022-07-17

## 2022-07-28 ENCOUNTER — EXTERNAL HOME HEALTH (OUTPATIENT)
Dept: HOME HEALTH SERVICES | Facility: HOSPITAL | Age: 82
End: 2022-07-28
Payer: MEDICARE

## 2022-07-29 ENCOUNTER — CARE AT HOME (OUTPATIENT)
Dept: HOME HEALTH SERVICES | Facility: CLINIC | Age: 82
End: 2022-07-29
Payer: MEDICARE

## 2022-07-29 DIAGNOSIS — R60.0 BILATERAL LOWER EXTREMITY EDEMA: ICD-10-CM

## 2022-07-29 DIAGNOSIS — F02.818 LATE ONSET ALZHEIMER'S DISEASE WITH BEHAVIORAL DISTURBANCE: ICD-10-CM

## 2022-07-29 DIAGNOSIS — Z99.3 WHEELCHAIR BOUND: ICD-10-CM

## 2022-07-29 DIAGNOSIS — Z51.5 PALLIATIVE CARE ENCOUNTER: Primary | ICD-10-CM

## 2022-07-29 DIAGNOSIS — L89.153 PRESSURE ULCER OF SACRAL REGION, STAGE 3: ICD-10-CM

## 2022-07-29 DIAGNOSIS — G30.1 LATE ONSET ALZHEIMER'S DISEASE WITH BEHAVIORAL DISTURBANCE: ICD-10-CM

## 2022-07-29 DIAGNOSIS — Z71.89 GOALS OF CARE, COUNSELING/DISCUSSION: ICD-10-CM

## 2022-07-29 DIAGNOSIS — R53.81 PHYSICAL DEBILITY: ICD-10-CM

## 2022-07-29 DIAGNOSIS — T14.8XXA BRUISING: ICD-10-CM

## 2022-07-29 PROCEDURE — 99499 UNLISTED E&M SERVICE: CPT | Mod: S$GLB,,, | Performed by: NURSE PRACTITIONER

## 2022-07-29 PROCEDURE — 99499 RISK ADDL DX/OHS AUDIT: ICD-10-PCS | Mod: S$GLB,,, | Performed by: NURSE PRACTITIONER

## 2022-07-29 PROCEDURE — 99350 HOME/RES VST EST HIGH MDM 60: CPT | Mod: S$GLB,,, | Performed by: NURSE PRACTITIONER

## 2022-07-29 PROCEDURE — 99350 PR HOME VISIT,ESTAB PATIENT,LEVEL IV: ICD-10-PCS | Mod: S$GLB,,, | Performed by: NURSE PRACTITIONER

## 2022-07-29 NOTE — PATIENT INSTRUCTIONS
FOLLOW-UP INSTRUCTIONS:    Follow-up with palliative care NP in 4-6 weeks on 8/17/2022@home visit  Continue all medications, treatments, and therapies as ordered  Fall precautions at all times  Maintain Safety precautions at all times  Attend all medical appointments as scheduled  F/u with PCP as needed  Patient to have 6 feet between each other  In an Emergency, call 911 or go to ED; notify PCP's office  9. Limit Risks of environmental exposure to coronavirus as discussed including: social distancing, hand hygiene, and limiting departures from the home for necessities only.   10. Patient not to be left alone  11. No refills needed  12. Recommend elevate BLE to reduce edema  13. HH to continue wound care to buttock  14. Left message for daughter to bring new socks (long and loose)

## 2022-07-29 NOTE — PROGRESS NOTES
[facility]  Palliative Care Home Visit    Visit Date: 7/29/2022  Encounter Provider:  Jayda Self DNP, FNP-c  PCP:  Annette Ruiz MD    Subjective:      Patient ID: Kayla Sanchez is a 80 y.o. female.    Consult Requested By:  Annette Ruiz M.D.  Reason for Consult:  Palliative Care    Chief Complaint: Palliative Care follow-up visit      Outpatient Palliative Care Encounter:    Patient is being seen at home due to physical debility that presents a taxing effort to leave the home, to mitigate high risk of hospital readmission and/or due to the limited availability of reliable or safe options for transportation to the point of access to the provider. Prior to treatment on this visit the chart was reviewed and patient/family verbal consent was obtained.      PMHx:    Ms. 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       PSHx:  Past surgical history includes adenoidectomy, appendectomy, cataract extraction, cholecystectomy, corneal transplant, bilateral eye surgery, thyroid surgery, and tonsillectomy.     Social History:    Patient was  to her  Casey (unsure how many  Years). Patient has 2 biological adult children and 2 adult step children (4 daughters), all living. Patient has 1 brother who lives out of state. 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 Patient's  served in the [place]  during WWII (??). Patient is unsure of which War and what branch. Lives  at home with her step daughter and she she has a private sitter M-F during.     Patient likes to watch t.v., eat, read, travel, and liked puzzles    Impression:    Ms. Sanchez was seen today@ [facility]. Patient is in the Memory Care Unit. Patient is sitting in wheelchair in the day area. Brought patient to her room and she is AA&Ox1 person. She speaks in word salad. No c/o pain. She has half socks below ankles cutting into her skin. Removed socks and pt yelling out in pain when socks removed. Placed loose pair and long socks on pt.   Pt has wound to posterior right heel with old scabby wound noted.     Called [facility] and spoke with Jessica . Requested nurse Kerwin take a photo of buttock wound and send to NP. Pt was recently discharged from PT but the nurse is stil seeing pt 1 time a week.    Patient has dark purple bruising to left hand and right FA.  -incontinent of bowel and bladder  -staff reports patient has good appetite and eating well  -BLE with 1-2+ pitting edema    Discussed wound with staff nurse and she reports the nurses do not do wound care here at [facility].               Applying barrier cream to area    Called daughter Ailyn on 8/1 @8:59 am and left message pt has wound to buttock and pt needs long loose socks if she could bring that for her mother        Goals of Care:  I initiated the process of advance care planning today and explained the importance of this process to the patient and family.  I introduced the concept of advance direcddtives to the patient, as well. Then the patient received detailed information about the importance of designating a Health Care Power of  (HCPOA). She was also instructed to communicate with this person about her wishes for future healthcare, should she become sick and lose decision-making capacity.    I Introduced [place] form with patient/family, explaining this is the patient's wishes, and this form will be uploaded  into [facility] and the [place] Registry.     7/29 Discussed goals of care with staff and would like her to be more cooperative    PLAN:    1. Palliative care NP to follow-up in 6-8 weeks on 8/17/2022@home visit only  2. Continue all medications  3  Not to be left alone  4. F/u with PCP as needed  5. In emergency, call 911 or go to ED; notify PCP's office  6. Facility to call if any problems or falls  7. Keep BLE elevated to reduce edema  8. Increase protein in diet>>protein shakes 1-2 day  9.Tylenol 500mg po daily and q 8 hours prn pain  10. Turn pt Q 2 hours to offload sacral wound and buttock    Review of Systems   Unable to perform ROS: Dementia (information provided by staff)     Assessments:  · Environmental: lives at assisted living, room is clean, uncluttered, good lighting  · Functional Status: needs assistance bathing and dressing; feeds self  · Safety:  Fall and Covid 19 precautions  · Nutritional: eats 3 meals a day; snacks throughout the day; loves chocolate milk  · Home Health/DME/Supplies: none    Symptom Assessment (ESAS 0-10 scale)     ESAS 0 1 2 3 4 5 6 7 8 9 10   Pain x             Dyspnea x             Anxiety   x           Nausea x             Depression  x             Anorexia x             Fatigue      x        Insomnia        x      Restlessness            x   Agitation       x         Constipation    no  Bowel Management Plan (BMP): no  Diarrhea        no  Comments: LBM unknown    Performance Status:   PPS Score:  50%  Karnofsky Score:  50%  FAST: 6c      History:  Past Medical History:   Diagnosis Date    Arthritis     Diabetes mellitus     Diabetes mellitus type II     GERD (gastroesophageal reflux disease)     Glaucoma     History of migraine headaches     Hyperlipidemia     Hypertension     Hypothyroidism     Late onset Alzheimer's disease with behavioral disturbance 9/2/2020    Neuropathy due to type 2 diabetes mellitus     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    Type 2 diabetes mellitus with renal manifestations 1/20/2016     Family History   Problem Relation Age of Onset    Cancer Mother     Thyroid disease Mother     Diabetes Father     Heart disease Father     COPD Brother     Migraines Daughter     Thyroid disease Maternal Aunt     Blindness Neg Hx     Cataracts Neg Hx     Glaucoma Neg Hx     Hypertension Neg Hx     Stroke Neg Hx     Amblyopia Neg Hx     Melanoma Neg Hx      Past Surgical History:   Procedure Laterality Date    ADENOIDECTOMY      APPENDECTOMY      CATARACT EXTRACTION      CHOLECYSTECTOMY      CORNEAL TRANSPLANT      DSEK OU      EYE SURGERY Bilateral     corneal transplant    THYROID SURGERY      goiter removal    TONSILLECTOMY       Review of patient's allergies indicates:  No Known Allergies  Medications:    Current Outpatient Medications:     mupirocin (BACTROBAN) 2 % ointment, Apply to affected area 2 times daily, Disp: 22 g, Rfl: 1    QUEtiapine (SEROQUEL) 25 MG Tab, Take 1 tablet (25 mg total) by mouth once daily., Disp: 30 tablet, Rfl: 11    24h Oral Morphine Equivalents (OME):  n/a    Objective:     Physical Exam:  Vitals:   5/4/2022    SpO2: 99% on room air   PainSc:   0   PainLoc:      There is no height or weight on file to calculate BMI.    Physical Exam  Vitals and nursing note reviewed.   Constitutional:       Appearance: Normal appearance.   HENT:      Head: Normocephalic and atraumatic.      Nose: Nose normal. No rhinorrhea.      Mouth/Throat:      Mouth: Mucous membranes are moist.      Pharynx: Oropharynx is clear.   Eyes:      Extraocular Movements: Extraocular movements intact.      Conjunctiva/sclera: Conjunctivae normal.   Cardiovascular:      Rate and Rhythm: Normal rate and regular rhythm.      Pulses: Normal pulses.      Heart sounds: Murmur (grade III/VI) heard.     No gallop.   Pulmonary:      Effort: Pulmonary effort is normal. No respiratory distress.      Breath sounds: Normal breath  sounds.      Comments: BBS CTA  Abdominal:      General: Bowel sounds are normal. There is no distension.      Palpations: Abdomen is soft.      Tenderness: There is no abdominal tenderness.   Genitourinary:     Comments: Incontinent of bowel and bladder; wearing pull-ups  Musculoskeletal:         General: Swelling present. Normal range of motion.      Cervical back: Normal range of motion and neck supple. No rigidity.      Right lower leg: Edema (1-2+ pitting edema) present.      Left lower leg: Edema (1-2+pitting edema) present.   Skin:     General: Skin is warm and dry.      Capillary Refill: Capillary refill takes 2 to 3 seconds.      Coloration: Skin is pale.      Findings: Bruising present to right FA and left hand; dark purple in color     Comments: +sacral/buttock wound no drainage noted. See photo; right posterior heel with old wound with scab  Neurological:      Mental Status: She is alert. She is disoriented.      Gait: Gait abnormal.      Comments: Oriented x 1 self only; confused; speaks in word salad   Psychiatric:         Mood and Affect: Mood normal.         Behavior: Behavior normal.      Comments: Confused, tangential thoughts         Labs:  CBC:   WBC   Date Value Ref Range Status   10/05/2020 9.95 3.90 - 12.70 K/uL Final     MCV   Date Value Ref Range Status   10/05/2020 89 82 - 98 fL Final            Hematocrit   Date Value Ref Range Status   10/05/2020 44.2 37.0 - 48.5 % Final                       Albumin:   Albumin   Date Value Ref Range Status   10/05/2020 3.5 3.5 - 5.2 g/dL Final     Protein:   Total Protein   Date Value Ref Range Status   10/05/2020 7.5 6.0 - 8.4 g/dL Final       Radiology:  I have reviewed all pertinent imaging results/findings within the past 24 hours.    Psychosocial/Cultural/Spiritual:   · F- Tammy and Belief:  Evangelical   · I - Importance: no  · C - Community:  [facility]  · A - Address in Care:  No spiritual needs identified      Assessment:     1.  Palliative care encounter  2. Goals of care discussion  3. Confusion  4. Late onset of Alzheimer's disease with behavioral disturbance  5. Bruising  6. Edema to both lower extremities  7. Pressure ulcer of sacral region, stage 3  8. Wheelchair bound      Plan:     Ethical / Legal: Advance Care Planning   · Surrogate decision maker:  Name: Ailyn Lane Relationship: step daughter POA 8/2/2017  · Code Status: DNR  · [place]POST:  Completed on 9/2017  · Other advance directive:  yes,   · Capacity to make medical decisions:  none,   · Conflict: none       Assessment and Plan:    Palliative care encounter  -palliative care referral placed  -initiate palliative care services today  -Reviewed [place]POST Form & left form at patient's home   -[place]POST Form completed on 9/16/2017  -Patient/family want to continue with Palliative care services    Counseling regarding advance care planning and goals of care   -pt would like to be able to remember more  -7/7 daughter would like her mother to be comfortable being at home  -10/5 wants to be comfortable  -11/30 Discussed goals of care with sitter/daughter/patient and would like to be comfortable  -5/4 Discussed goals of care with caregivers and would like to keep her pain free and comfortable  -6/23 Would like PT for strengthening and [facility] for wound care  -7/29 Discussed goals of care with staff and would like her to be more cooperative    Were controlled substances prescribed?  No    Total clinical care time was 60min. Reviewed Chart and PCP's notes thenThe following issues were discussed: medications, diet, bruising, Alzheimer's disease, confusion, falls, edema of both lower extremities, pressure ulcer of sacral region stage 3; discussed goals of care      Follow Up Appointments:     1. F/u with Palliative care NP on 8/17/2022@home visit only    Patient and family agreed via verbal consent to access medical records and for assessment and treatment during this visit.    Attestation:  Screening criteria to assess the level of the patient's risk for infection with COVID-19 as recommended by the CDC at the time of the above documented home visit concluded appropriateness to proceed.     Universal precautions were maintained at all times, including provider use of >60% alcohol gel hand  immediately prior to entry and upon departing the patient's home as well as cleaning of equipment used in home visit with antibacterial/germicidal disposable wipes.    Patient has not been exposed to anyone with the virus (to the patient's knowledge)  Patient has not been out of the country in the last 14 days.    NP wore face mask entire length of visit    Jayda Self DNP, FNP-C  [facility]  [phone]

## 2022-08-04 ENCOUNTER — DOCUMENT SCAN (OUTPATIENT)
Dept: HOME HEALTH SERVICES | Facility: HOSPITAL | Age: 82
End: 2022-08-04
Payer: MEDICARE

## 2022-08-12 ENCOUNTER — DOCUMENT SCAN (OUTPATIENT)
Dept: HOME HEALTH SERVICES | Facility: HOSPITAL | Age: 82
End: 2022-08-12
Payer: MEDICARE

## 2022-08-17 ENCOUNTER — CARE AT HOME (OUTPATIENT)
Dept: HOME HEALTH SERVICES | Facility: CLINIC | Age: 82
End: 2022-08-17
Payer: MEDICARE

## 2022-08-17 VITALS
SYSTOLIC BLOOD PRESSURE: 110 MMHG | TEMPERATURE: 97 F | OXYGEN SATURATION: 98 % | HEART RATE: 55 BPM | RESPIRATION RATE: 16 BRPM | DIASTOLIC BLOOD PRESSURE: 70 MMHG

## 2022-08-17 DIAGNOSIS — R63.4 WEIGHT LOSS: ICD-10-CM

## 2022-08-17 DIAGNOSIS — Z51.5 PALLIATIVE CARE ENCOUNTER: Primary | ICD-10-CM

## 2022-08-17 DIAGNOSIS — R41.0 CONFUSION: ICD-10-CM

## 2022-08-17 DIAGNOSIS — E43 SEVERE PROTEIN-CALORIE MALNUTRITION: ICD-10-CM

## 2022-08-17 DIAGNOSIS — Z71.89 COUNSELING REGARDING ADVANCE CARE PLANNING AND GOALS OF CARE: ICD-10-CM

## 2022-08-17 DIAGNOSIS — F02.818 LATE ONSET ALZHEIMER'S DISEASE WITH BEHAVIORAL DISTURBANCE: ICD-10-CM

## 2022-08-17 DIAGNOSIS — R60.0 BILATERAL LOWER EXTREMITY EDEMA: ICD-10-CM

## 2022-08-17 DIAGNOSIS — S31.809D WOUND OF BUTTOCK, UNSPECIFIED LATERALITY, SUBSEQUENT ENCOUNTER: ICD-10-CM

## 2022-08-17 DIAGNOSIS — Z99.3 WHEELCHAIR BOUND: ICD-10-CM

## 2022-08-17 DIAGNOSIS — T14.8XXA BRUISING: ICD-10-CM

## 2022-08-17 DIAGNOSIS — G30.1 LATE ONSET ALZHEIMER'S DISEASE WITH BEHAVIORAL DISTURBANCE: ICD-10-CM

## 2022-08-17 PROCEDURE — 99497 ADVNCD CARE PLAN 30 MIN: CPT | Mod: S$GLB,,, | Performed by: NURSE PRACTITIONER

## 2022-08-17 PROCEDURE — 99350 HOME/RES VST EST HIGH MDM 60: CPT | Mod: S$GLB,,, | Performed by: NURSE PRACTITIONER

## 2022-08-17 PROCEDURE — 99497 PR ADVNCD CARE PLAN 30 MIN: ICD-10-PCS | Mod: S$GLB,,, | Performed by: NURSE PRACTITIONER

## 2022-08-17 PROCEDURE — 99350 PR HOME VISIT,ESTAB PATIENT,LEVEL IV: ICD-10-PCS | Mod: S$GLB,,, | Performed by: NURSE PRACTITIONER

## 2022-08-17 NOTE — PATIENT INSTRUCTIONS
FOLLOW-UP INSTRUCTIONS:    Follow-up with palliative care NP in 4-6 weeks on 9/23/2022@home visit  Continue all medications, treatments, and therapies as ordered  Fall precautions at all times  Maintain Safety precautions at all times  Attend all medical appointments as scheduled  F/u with PCP as needed  Patient to have 6 feet between each other  In an Emergency, call 911 or go to ED; notify PCP's office  9. Limit Risks of environmental exposure to coronavirus as discussed including: social distancing, hand hygiene, and limiting departures from the home for necessities only.   10. Patient not to be left alone  11. Recommend referral to Hospice for evaluation   12. Recommend daughter brings longer and looser socks and not half socks

## 2022-08-17 NOTE — PROGRESS NOTES
[facility]  Palliative Care Home Visit    Visit Date: 8/17/2022  Encounter Provider:  Jayda Self DNP, FNP-c  PCP:  Annette Ruiz MD    Subjective:      Patient ID: Kayla Sanchez is a 80 y.o. female.    Consult Requested By:  Annette Ruiz M.D.  Reason for Consult:  Palliative Care    Chief Complaint: Palliative Care follow-up visit      Outpatient Palliative Care Encounter:    Patient is being seen at home due to physical debility that presents a taxing effort to leave the home, to mitigate high risk of hospital readmission and/or due to the limited availability of reliable or safe options for transportation to the point of access to the provider. Prior to treatment on this visit the chart was reviewed and patient/family verbal consent was obtained.      PMHx:    Ms. 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       PSHx:  Past surgical history includes adenoidectomy, appendectomy, cataract extraction, cholecystectomy, corneal transplant, bilateral eye surgery, thyroid surgery, and tonsillectomy.     Social History:    Patient was  to her  Casey (unsure how many  Years). Patient has 2 biological adult children and 2 adult step children (4 daughters), all living. Patient has 1 brother who lives out of state. 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 Patient's  served in the [place]  during [place]II (??). Patient is unsure of which War and what branch. Lives  "at home with her step daughter and she she has a private sitter M-F during.     Patient likes to watch t.v., eat, read, travel, and liked puzzles    Impression:    Ms. Garza was seen today@[facility] in the memory care unit for a palliative care follow-up visit. She is sitting in her wheelchair in the day area. She appears sleeping; brought to her room for assessment. Eyes closed and head nodding down. No c/o pain    --multiple dark purple/grey bruising noted to bilateral upper extremities  -appears to have lost weight>>clothes big onher  -when speaking, she talks in word salad  -oriented x 1 person only  -Bilateral LE pitting edema 1+>>>difficulty having her keeping legs elevated  -right eye is droopy    She is wearing half socks below ankle appears to be cutting into her skin and when removed pt yelled out in pain      Note:  Staff reports she can be combative at times and hits on some of the staff members. Also, they report she has lost weight due to "playing with her food and not eating. She won't let us feed her." Np sat with patient for 30 minutes and fed her lunch which she cooperated. She ate approximately 75% of her meal.         Goals of Care:  I initiated the process of advance care planning today and explained the importance of this process to the patient and family.  I introduced the concept of advance direcddtives to the patient, as well. Then the patient received detailed information about the importance of designating a Health Care Power of  (HCPOA). She was also instructed to communicate with this person about her wishes for future healthcare, should she become sick and lose decision-making capacity.    I Introduced [place] form with patient/family, explaining this is the patient's wishes, and this form will be uploaded into the patient's [facility] and the [place] Registry.     8/18 Discussed goals of care with daughter on this day and she would like her mother to be pain free and " comfortable    PLAN:    1. Palliative care NP to follow-up in 6-8 weeks on 9/23/2022@home visit only  2. Continue all medications  3  Not to be left alone  4. F/u with PCP as needed  5. In emergency, call 911 or go to ED; notify PCP's office  6. Facility to call if any problems or falls  7. Keep BLE elevated   8. Increase protein in diet>>protein shakes 1-2 day  9. Recommend compression stockings on during the day and off at night  10. Recommend hospice referral for evaluation    Review of Systems:  Other: pt has dementia and can't answer    Assessments:  Environmental: lives at [facility] in Memory care unit  Functional Status: needs assistance bathing, dressing, and eating  Safety:  Fall and Covid 19 precautions  Nutritional: eats 3 meals a day; snacks throughout the day; loves chocolate milk and loves chocolate  Home Health/DME/Supplies: none    Symptom Assessment (ESAS 0-10 scale)     ESAS 0 1 2 3 4 5 6 7 8 9 10   Pain x             Dyspnea   x           Anxiety   x           Nausea x             Depression    x           Anorexia x             Fatigue      x        Insomnia        x      Restlessness            x   Agitation       x         Constipation    no  Bowel Management Plan (BMP): no  Diarrhea        no  Comments: LBM 8/16?    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    Type 2 diabetes mellitus with renal manifestations 1/20/2016     Family History   Problem Relation Age of Onset    Cancer Mother     Thyroid disease Mother     Diabetes  Father     Heart disease Father     COPD Brother     Migraines Daughter     Thyroid disease Maternal Aunt     Blindness Neg Hx     Cataracts Neg Hx     Glaucoma Neg Hx     Hypertension Neg Hx     Stroke Neg Hx     Amblyopia Neg Hx     Melanoma Neg Hx      Past Surgical History:   Procedure Laterality Date    ADENOIDECTOMY      APPENDECTOMY      CATARACT EXTRACTION      CHOLECYSTECTOMY      CORNEAL TRANSPLANT      DSEK OU      EYE SURGERY Bilateral     corneal transplant    THYROID SURGERY      goiter removal    TONSILLECTOMY       Review of patient's allergies indicates:  No Known Allergies  Medications:    Current Outpatient Medications:     mupirocin (BACTROBAN) 2 % ointment, Apply to affected area 2 times daily, Disp: 22 g, Rfl: 1    QUEtiapine (SEROQUEL) 25 MG Tab, Take 1 tablet (25 mg total) by mouth once daily., Disp: 30 tablet, Rfl: 11    24h Oral Morphine Equivalents (OME):  n/a    Objective:     Physical Exam:  Vitals:   5/4/2022    SpO2: 99% on room air   PainSc:   0   PainLoc:      There is no height or weight on file to calculate BMI.    Physical Exam  Vitals and nursing note reviewed. Exam conducted with a chaperone present.   Constitutional:       Appearance: Normal appearance. Sitting in wheelchair  HENT:      Head: Normocephalic and atraumatic.      Nose: Nose normal. No rhinorrhea.      Mouth/Throat:      Mouth: Mucous membranes are moist.      Pharynx: Oropharynx is clear.   Eyes:      Extraocular Movements: Extraocular movements intact.      Conjunctiva/sclera: Conjunctivae normal.   Cardiovascular:      Rate and Rhythm: Normal rate and regular rhythm.      Pulses: Normal pulses.      Heart sounds: Murmur (grade II/VI) heard.     No gallop.   Pulmonary:      Effort: Pulmonary effort is normal. No respiratory distress.      Breath sounds: Normal breath sounds.      Comments: BBS CTA  Abdominal:      General: Bowel sounds are normal. There is no distension.      Palpations: Abdomen is soft.       Tenderness: There is no abdominal tenderness.   Genitourinary:     Comments: Incontinent of bowel and bladder; wearing pull-ups  Musculoskeletal:         General: Swelling present. Normal range of motion.      Cervical back: Normal range of motion and neck supple. No rigidity.      Right lower leg: Edema (1-2+ pitting edema) present.      Left lower leg: Edema (1-2+pitting edema) present.   Skin:     General: Skin is warm and dry.      Capillary Refill: Capillary refill takes 2 to 3 seconds.      Coloration: Skin is pale.      Findings: Bruising present. Bruising to B upper extremities     Comments: dark purple bruising to bilateral upper extremities  Neurological:      Mental Status: She is alert. She is disoriented.      Gait: Gait abnormal.      Comments: Oriented x 1 self only; confused; speaks in word salad   Psychiatric:         Mood and Affect: Mood normal.         Behavior: Behavior normal.      Comments: Confused, tangential thoughts         Labs:  CBC:   WBC   Date Value Ref Range Status   10/05/2020 9.95 3.90 - 12.70 K/uL Final     MCV   Date Value Ref Range Status   10/05/2020 89 82 - 98 fL Final            Hematocrit   Date Value Ref Range Status   10/05/2020 44.2 37.0 - 48.5 % Final                       Albumin:   Albumin   Date Value Ref Range Status   10/05/2020 3.5 3.5 - 5.2 g/dL Final     Protein:   Total Protein   Date Value Ref Range Status   10/05/2020 7.5 6.0 - 8.4 g/dL Final       Radiology:  I have reviewed all pertinent imaging results/findings within the past 24 hours.    Psychosocial/Cultural/Spiritual:   F- Tammy and Belief:  Rastafarian   I - Importance: no  C - Community:  [facility] [place]  A - Address in Care:  No spiritual needs identified      Assessment:     1. Palliative care encounter  2. Counseling regarding advance care planning and goals of care  3. Confusion  4. Late onset of Alzheimer's disease with behavioral disturbance  5. Bruising  6. Edema to both lower  extremities  7. Wound to buttock, unspecified laterality, subsequent encounter  8. Wheelchair bound  9. Weight loss  10. Severe protein-calorie malnutrition      Plan:     Ethical / Legal: Advance Care Planning   Surrogate decision maker:  Name: Ailyn Lane Relationship: step daughter POA 8/2/2017  Code Status: DNR  [place]:  Completed on 9/2017  Other advance directive:  yes,   Capacity to make medical decisions:  none,   Conflict: none       Assessment and Plan:    Palliative care encounter  -palliative care referral placed  -initiate palliative care services today  -Reviewed [place] Form & left form at patient's home   -[place] Form completed on 9/16/2017  -Patient/family want to continue with Palliative care services  -8/18 called daughter Ailyn and discussed hospice with her; would talk with her sisters    Counseling regarding advance care planning and goals of care   -pt would like to be able to remember more  -7/7 daughter would like her mother to be comfortable being at home  -10/5 wants to be comfortable  -11/30 Discussed goals of care with sitter/daughter/patient and would like to be comfortable  -5/4 Discussed goals of care with caregivers and would like to keep her pain free and comfortable  -6/23 Would like PT for strengthening and [facility] for wound care  -8/17 Discussed goals of care with staff and daughter and would like to keep her comfortable    Were controlled substances prescribed?  No    Total clinical care time was 60min. Reviewed Chart and PCP's notes thenThe following issues were discussed: medications, diet, medications, bruising, Alzheimer's disease, confusion, falls, edema of both lower extremities, wheelchair bound;weight loss, severe protein calorie malnutrition    An additional 30 minutes of the visit was spent in advanced care planning. On 8/18/2022 called daughter and spoke hospice and hospice benefits with her. Discussed goals of care      Follow Up Appointments:     1. F/u with Palliative  care NP on  9/23/2022@home visit only    Patient and family agreed via verbal consent to access medical records and for assessment and treatment during this visit.    Attestation: Screening criteria to assess the level of the patient's risk for infection with COVID-19 as recommended by the CDC at the time of the above documented home visit concluded appropriateness to proceed.     Universal precautions were maintained at all times, including provider use of >60% alcohol gel hand  immediately prior to entry and upon departing the patient's home as well as cleaning of equipment used in home visit with antibacterial/germicidal disposable wipes.    Patient has not been exposed to anyone with the virus (to the patient's knowledge)  Patient has not been out of the country in the last 14 days.    NP wore face mask entire length of visit    Jayda Self DNP, FNP-C  [facility]  [phone]

## 2022-08-21 VITALS
DIASTOLIC BLOOD PRESSURE: 80 MMHG | OXYGEN SATURATION: 98 % | SYSTOLIC BLOOD PRESSURE: 108 MMHG | TEMPERATURE: 98 F | HEART RATE: 103 BPM | RESPIRATION RATE: 18 BRPM

## 2022-08-28 ENCOUNTER — TELEPHONE (OUTPATIENT)
Dept: HOME HEALTH SERVICES | Facility: CLINIC | Age: 82
End: 2022-08-28
Payer: MEDICARE

## 2022-08-28 NOTE — TELEPHONE ENCOUNTER
8/18/2022 6:47pm    Called patient's daughter Ailyn Krishna concerning recent visit to see her mother on 8/17. Discussed patient has appeared to lost weight and her clothes are fitting very loose. Staff members report she is playing with her food and not eating much. Notified her I spent 20-30mins feeding her.   Staff members report she can be combative with them. Transfers mostly via wheelchair. Daughter reports she recently fee a few days ago>>fell against the wall and slid down. She was uninjured.    Discussed with her the benefits of hospice and recommended to have her evaluated. Ailyn expressed she will discuss with her sisters and let me know.    Jayda Self, LAUREN, FNP-C  [facility]  [phone]

## 2022-09-18 ENCOUNTER — TELEPHONE (OUTPATIENT)
Dept: HOME HEALTH SERVICES | Facility: CLINIC | Age: 82
End: 2022-09-18
Payer: MEDICARE

## 2022-09-18 NOTE — TELEPHONE ENCOUNTER
9/11/2022    Received call from Addi from [facility] and requested return call. No issues stated.    NP off but returned call on 9/12 @9:15 am and left message for return call. Message left for Nurse Rickey Rhoades    Called 2nd time @10:20 9/13 left message again after being told nurse is unavailable. No return call/    Jayda Self DNP, FNP-C  [facility]  [phone]

## 2022-10-11 NOTE — TELEPHONE ENCOUNTER
Spoke to daughter Ailyn about Hospice. Discussed decline in patient's health at [facility].  She would like to discuss with her sisters.    Offered support and instructed her to call me if she needs to discuss further    Jayda Self DNP, FNP-C  [facility]  [phone]

## 2023-02-07 DIAGNOSIS — Z00.00 ENCOUNTER FOR MEDICARE ANNUAL WELLNESS EXAM: ICD-10-CM

## 2023-02-09 DIAGNOSIS — Z00.00 ENCOUNTER FOR MEDICARE ANNUAL WELLNESS EXAM: ICD-10-CM

## 2023-02-24 ENCOUNTER — TELEPHONE (OUTPATIENT)
Dept: PRIMARY CARE CLINIC | Facility: CLINIC | Age: 83
End: 2023-02-24
Payer: MEDICARE

## 2023-02-24 NOTE — TELEPHONE ENCOUNTER
Patient  in her home on hospice.  Please maci her as .    Thanks,  Annette Ruiz MD/MPH  [facility]  [phone] jeyink

## 2024-05-01 NOTE — PATIENT INSTRUCTIONS
FOLLOW-UP INSTRUCTIONS:    Follow-up with palliative care NP in 4-6 weeks on 7/28/2022@[facility]  Continue all medications, treatments, and therapies as ordered  Fall precautions at all times  Maintain Safety precautions at all times  Attend all medical appointments as scheduled  F/u with PCP as needed  Patient to have 6 feet between each other  In an Emergency, call 911 or go to ED; notify PCP's office  9. Limit Risks of environmental exposure to coronavirus as discussed including: social distancing, hand hygiene, and limiting departures from the home for necessities only.   10. Patient not to be left alone  11. Referral placed for [facility] for nurse to do wound care and PT to evaluate and treat  12. Recommend increasing protein in diet>>>protein shakes 1-2 daily  13. Turn Q 2 to offload buttocks and wound  14. Compression stockings on during the day and off at night  15. Try to elevate BLE to reduce edema  16. Facility to call [phone] for any falls or problems; also can call PCP's office    
no